# Patient Record
Sex: MALE | Race: BLACK OR AFRICAN AMERICAN | NOT HISPANIC OR LATINO | URBAN - METROPOLITAN AREA
[De-identification: names, ages, dates, MRNs, and addresses within clinical notes are randomized per-mention and may not be internally consistent; named-entity substitution may affect disease eponyms.]

---

## 2021-06-30 ENCOUNTER — TELEPHONE (OUTPATIENT)
Dept: ENDOCRINOLOGY | Facility: CLINIC | Age: 31
End: 2021-06-30

## 2021-11-30 ENCOUNTER — OFFICE VISIT (OUTPATIENT)
Dept: ENDOCRINOLOGY | Facility: CLINIC | Age: 31
End: 2021-11-30
Payer: MEDICAID

## 2021-11-30 ENCOUNTER — LAB VISIT (OUTPATIENT)
Dept: LAB | Facility: HOSPITAL | Age: 31
End: 2021-11-30
Attending: NURSE PRACTITIONER
Payer: MEDICAID

## 2021-11-30 VITALS
HEART RATE: 72 BPM | HEIGHT: 71 IN | DIASTOLIC BLOOD PRESSURE: 72 MMHG | WEIGHT: 172.19 LBS | BODY MASS INDEX: 24.1 KG/M2 | SYSTOLIC BLOOD PRESSURE: 110 MMHG | RESPIRATION RATE: 18 BRPM

## 2021-11-30 DIAGNOSIS — E10.9 TYPE 1 DIABETES MELLITUS WITHOUT COMPLICATION: ICD-10-CM

## 2021-11-30 DIAGNOSIS — E10.9 TYPE 1 DIABETES MELLITUS WITHOUT COMPLICATION: Primary | ICD-10-CM

## 2021-11-30 LAB
ALBUMIN SERPL BCP-MCNC: 4.2 G/DL (ref 3.5–5.2)
ALP SERPL-CCNC: 82 U/L (ref 55–135)
ALT SERPL W/O P-5'-P-CCNC: 19 U/L (ref 10–44)
ANION GAP SERPL CALC-SCNC: 6 MMOL/L (ref 8–16)
AST SERPL-CCNC: 18 U/L (ref 10–40)
BILIRUB SERPL-MCNC: 1.1 MG/DL (ref 0.1–1)
BUN SERPL-MCNC: 17 MG/DL (ref 6–20)
C PEPTIDE SERPL-MCNC: 0.1 NG/ML (ref 0.78–5.19)
CALCIUM SERPL-MCNC: 10.1 MG/DL (ref 8.7–10.5)
CHLORIDE SERPL-SCNC: 100 MMOL/L (ref 95–110)
CHOLEST SERPL-MCNC: 259 MG/DL (ref 120–199)
CHOLEST/HDLC SERPL: 4.5 {RATIO} (ref 2–5)
CO2 SERPL-SCNC: 31 MMOL/L (ref 23–29)
CREAT SERPL-MCNC: 1 MG/DL (ref 0.5–1.4)
EST. GFR  (AFRICAN AMERICAN): >60 ML/MIN/1.73 M^2
EST. GFR  (NON AFRICAN AMERICAN): >60 ML/MIN/1.73 M^2
ESTIMATED AVG GLUCOSE: 278 MG/DL (ref 68–131)
GLUCOSE SERPL-MCNC: 277 MG/DL (ref 70–110)
HBA1C MFR BLD: 11.3 % (ref 4–5.6)
HDLC SERPL-MCNC: 57 MG/DL (ref 40–75)
HDLC SERPL: 22 % (ref 20–50)
LDLC SERPL CALC-MCNC: 186.2 MG/DL (ref 63–159)
NONHDLC SERPL-MCNC: 202 MG/DL
POTASSIUM SERPL-SCNC: 4.7 MMOL/L (ref 3.5–5.1)
PROT SERPL-MCNC: 7.3 G/DL (ref 6–8.4)
SODIUM SERPL-SCNC: 137 MMOL/L (ref 136–145)
TRIGL SERPL-MCNC: 79 MG/DL (ref 30–150)
TSH SERPL DL<=0.005 MIU/L-ACNC: 1.34 UIU/ML (ref 0.4–4)

## 2021-11-30 PROCEDURE — 99204 OFFICE O/P NEW MOD 45 MIN: CPT | Mod: S$PBB,,, | Performed by: NURSE PRACTITIONER

## 2021-11-30 PROCEDURE — 99214 OFFICE O/P EST MOD 30 MIN: CPT | Mod: PBBFAC,PO | Performed by: NURSE PRACTITIONER

## 2021-11-30 PROCEDURE — 99999 PR PBB SHADOW E&M-EST. PATIENT-LVL IV: ICD-10-PCS | Mod: PBBFAC,,, | Performed by: NURSE PRACTITIONER

## 2021-11-30 PROCEDURE — 99999 PR PBB SHADOW E&M-EST. PATIENT-LVL IV: CPT | Mod: PBBFAC,,, | Performed by: NURSE PRACTITIONER

## 2021-11-30 PROCEDURE — 36415 COLL VENOUS BLD VENIPUNCTURE: CPT | Mod: PO | Performed by: NURSE PRACTITIONER

## 2021-11-30 PROCEDURE — 80053 COMPREHEN METABOLIC PANEL: CPT | Performed by: NURSE PRACTITIONER

## 2021-11-30 PROCEDURE — 84681 ASSAY OF C-PEPTIDE: CPT | Performed by: NURSE PRACTITIONER

## 2021-11-30 PROCEDURE — 84443 ASSAY THYROID STIM HORMONE: CPT | Performed by: NURSE PRACTITIONER

## 2021-11-30 PROCEDURE — 80061 LIPID PANEL: CPT | Performed by: NURSE PRACTITIONER

## 2021-11-30 PROCEDURE — 83036 HEMOGLOBIN GLYCOSYLATED A1C: CPT | Performed by: NURSE PRACTITIONER

## 2021-11-30 PROCEDURE — 99204 PR OFFICE/OUTPT VISIT, NEW, LEVL IV, 45-59 MIN: ICD-10-PCS | Mod: S$PBB,,, | Performed by: NURSE PRACTITIONER

## 2021-11-30 RX ORDER — BLOOD-GLUCOSE SENSOR
EACH MISCELLANEOUS
COMMUNITY
Start: 2021-10-13 | End: 2022-01-03 | Stop reason: SDUPTHER

## 2021-11-30 RX ORDER — BLOOD-GLUCOSE METER
1 EACH MISCELLANEOUS 4 TIMES DAILY
COMMUNITY
Start: 2021-08-04

## 2021-11-30 RX ORDER — INSULIN LISPRO 100 [IU]/ML
INJECTION, SOLUTION INTRAVENOUS; SUBCUTANEOUS
COMMUNITY

## 2021-11-30 RX ORDER — INSULIN GLARGINE 100 [IU]/ML
33 INJECTION, SOLUTION SUBCUTANEOUS
COMMUNITY
Start: 2021-01-04 | End: 2022-01-26 | Stop reason: SDUPTHER

## 2021-11-30 RX ORDER — BLOOD-GLUCOSE,RECEIVER,CONT
EACH MISCELLANEOUS
COMMUNITY
Start: 2021-10-13

## 2021-12-22 ENCOUNTER — OFFICE VISIT (OUTPATIENT)
Dept: ENDOCRINOLOGY | Facility: CLINIC | Age: 31
End: 2021-12-22
Payer: MEDICAID

## 2021-12-22 VITALS
SYSTOLIC BLOOD PRESSURE: 122 MMHG | HEART RATE: 66 BPM | WEIGHT: 175.13 LBS | DIASTOLIC BLOOD PRESSURE: 82 MMHG | BODY MASS INDEX: 24.52 KG/M2 | HEIGHT: 71 IN

## 2021-12-22 DIAGNOSIS — E78.5 DYSLIPIDEMIA: ICD-10-CM

## 2021-12-22 DIAGNOSIS — E10.9 TYPE 1 DIABETES MELLITUS WITHOUT COMPLICATION: Primary | ICD-10-CM

## 2021-12-22 DIAGNOSIS — E11.649 HYPOGLYCEMIA ASSOCIATED WITH DIABETES: ICD-10-CM

## 2021-12-22 PROCEDURE — 99213 OFFICE O/P EST LOW 20 MIN: CPT | Mod: S$PBB,,, | Performed by: NURSE PRACTITIONER

## 2021-12-22 PROCEDURE — 3066F PR DOCUMENTATION OF TREATMENT FOR NEPHROPATHY: ICD-10-PCS | Mod: CPTII,,, | Performed by: NURSE PRACTITIONER

## 2021-12-22 PROCEDURE — 99999 PR PBB SHADOW E&M-EST. PATIENT-LVL III: ICD-10-PCS | Mod: PBBFAC,,, | Performed by: NURSE PRACTITIONER

## 2021-12-22 PROCEDURE — 99213 OFFICE O/P EST LOW 20 MIN: CPT | Mod: PBBFAC,PO | Performed by: NURSE PRACTITIONER

## 2021-12-22 PROCEDURE — 1159F PR MEDICATION LIST DOCUMENTED IN MEDICAL RECORD: ICD-10-PCS | Mod: CPTII,,, | Performed by: NURSE PRACTITIONER

## 2021-12-22 PROCEDURE — 1160F PR REVIEW ALL MEDS BY PRESCRIBER/CLIN PHARMACIST DOCUMENTED: ICD-10-PCS | Mod: CPTII,,, | Performed by: NURSE PRACTITIONER

## 2021-12-22 PROCEDURE — 1159F MED LIST DOCD IN RCRD: CPT | Mod: CPTII,,, | Performed by: NURSE PRACTITIONER

## 2021-12-22 PROCEDURE — 3008F BODY MASS INDEX DOCD: CPT | Mod: CPTII,,, | Performed by: NURSE PRACTITIONER

## 2021-12-22 PROCEDURE — 3008F PR BODY MASS INDEX (BMI) DOCUMENTED: ICD-10-PCS | Mod: CPTII,,, | Performed by: NURSE PRACTITIONER

## 2021-12-22 PROCEDURE — 99213 PR OFFICE/OUTPT VISIT, EST, LEVL III, 20-29 MIN: ICD-10-PCS | Mod: S$PBB,,, | Performed by: NURSE PRACTITIONER

## 2021-12-22 PROCEDURE — 3066F NEPHROPATHY DOC TX: CPT | Mod: CPTII,,, | Performed by: NURSE PRACTITIONER

## 2021-12-22 PROCEDURE — 99999 PR PBB SHADOW E&M-EST. PATIENT-LVL III: CPT | Mod: PBBFAC,,, | Performed by: NURSE PRACTITIONER

## 2021-12-22 PROCEDURE — 1160F RVW MEDS BY RX/DR IN RCRD: CPT | Mod: CPTII,,, | Performed by: NURSE PRACTITIONER

## 2021-12-22 PROCEDURE — 3061F PR NEG MICROALBUMINURIA RESULT DOCUMENTED/REVIEW: ICD-10-PCS | Mod: CPTII,,, | Performed by: NURSE PRACTITIONER

## 2021-12-22 PROCEDURE — 3061F NEG MICROALBUMINURIA REV: CPT | Mod: CPTII,,, | Performed by: NURSE PRACTITIONER

## 2021-12-22 RX ORDER — LANCETS
EACH MISCELLANEOUS
COMMUNITY
Start: 2021-09-10

## 2021-12-22 RX ORDER — PEN NEEDLE, DIABETIC 30 GX3/16"
NEEDLE, DISPOSABLE MISCELLANEOUS
COMMUNITY
Start: 2021-01-04 | End: 2022-07-28 | Stop reason: SDUPTHER

## 2021-12-22 RX ORDER — ATORVASTATIN CALCIUM 20 MG/1
20 TABLET, FILM COATED ORAL DAILY
Qty: 30 TABLET | Refills: 11 | Status: SHIPPED | OUTPATIENT
Start: 2021-12-22 | End: 2022-07-28 | Stop reason: SDUPTHER

## 2022-01-03 ENCOUNTER — PATIENT MESSAGE (OUTPATIENT)
Dept: ENDOCRINOLOGY | Facility: CLINIC | Age: 32
End: 2022-01-03
Payer: MEDICAID

## 2022-01-04 RX ORDER — BLOOD-GLUCOSE SENSOR
EACH MISCELLANEOUS
Qty: 9 EACH | Status: SHIPPED | OUTPATIENT
Start: 2022-01-04 | End: 2024-01-30 | Stop reason: SDUPTHER

## 2022-01-10 ENCOUNTER — TELEPHONE (OUTPATIENT)
Dept: DIABETES | Facility: CLINIC | Age: 32
End: 2022-01-10

## 2022-01-10 ENCOUNTER — CLINICAL SUPPORT (OUTPATIENT)
Dept: DIABETES | Facility: CLINIC | Age: 32
End: 2022-01-10
Payer: MEDICAID

## 2022-01-10 VITALS — WEIGHT: 170.19 LBS | BODY MASS INDEX: 24.37 KG/M2 | HEIGHT: 70 IN

## 2022-01-10 DIAGNOSIS — E10.9 TYPE 1 DIABETES MELLITUS WITHOUT COMPLICATION: ICD-10-CM

## 2022-01-10 PROCEDURE — 99999 PR PBB SHADOW E&M-EST. PATIENT-LVL II: ICD-10-PCS | Mod: PBBFAC,,,

## 2022-01-10 PROCEDURE — G0108 DIAB MANAGE TRN  PER INDIV: HCPCS | Mod: PBBFAC,PO | Performed by: DIETITIAN, REGISTERED

## 2022-01-10 PROCEDURE — 99212 OFFICE O/P EST SF 10 MIN: CPT | Mod: PBBFAC,PO | Performed by: DIETITIAN, REGISTERED

## 2022-01-10 PROCEDURE — 99999 PR PBB SHADOW E&M-EST. PATIENT-LVL II: CPT | Mod: PBBFAC,,,

## 2022-01-10 NOTE — TELEPHONE ENCOUNTER
Patient requests that his script for Dexcom sensors and transmitter be sent to the [facility] in [place] instead.  He has not been able to get the [facility] in [place] to send us a request for prior auth to get his sensors covered and would like to try a different [facility].  Can you resend?

## 2022-01-10 NOTE — PROGRESS NOTES
Diabetes Care Specialist Progress Note  Author: Mely Mcmullen RD, CDE  Date: 1/10/2022    Program Intake  Reason for Diabetes Program Visit:: Intervention  Type of Intervention:: Individual  Individual: Education  Education: Self-Management Skill Review,Nutrition and Meal Planning  Current diabetes risk level:: high  In the last 12 months, have you:: none    Lab Results   Component Value Date    HGBA1C 11.3 (H) 11/30/2021     Clinical    Problem Review  Reviewed Problem List with Patient: yes  Active comorbidities affecting diabetes self-care.: no  Reviewed health maintenance: yes    Clinical Assessment  Current Diabetes Treatment: Insulin-Lantus 25 units daily, Humalog 12-12-12 - sometimes lowering dose to 4-6 units when blood sugar is good prior to the meal  Have you ever experienced hypoglycemia?: yes  In the last month, how often have you experienced low blood sugar?:  none recent  Are you able to tell when your blood sugar is low?: Yes  What symptoms do you experience?: Sweaty  How do you treat hypoglycemia?: 1/2 can soda/fruit juice  Have you ever experienced hyperglycemia?: yes    Medication Information  How do you obtain your medications?: Patient drives  How many days a week do you miss your medications?:  May miss his meal time dose a few times weekly.  Reports compliance with Lantus although he did not get last night.  Do you sometimes have difficulty refilling your medications?: No  Medication adherence impacting ability to self-manage diabetes?: No    Labs  Do you have regular lab work to monitor your medications?: Yes  Type of Regular Lab Work: A1c,Cholesterol,BMP  [facility]  Lab Compliance Barriers: No    Nutritional Status  Diet: Diabetic diet -Patient reports he eats out often.  He watches what he eats but doesn't necessarily count carbs.  Does not eat many sweets.  Drinks diet drinks/water  Meal Plan 24 Hour Recall: Breakfast,Lunch  Meal Plan 24 Hour Recall -  Breakfast:  Grits, biscuit, egg  Meal Plan 24 Hour Recall - Lunch:  [employer], chick galina a (chicken sandwich and a few fries)  Change in appetite?: No  Dentation:: Intact  Recent Changes in Weight: No Recent Weight Change  Current nutritional status an area of need that is impacting patient's ability to self-manage diabetes?: No    Additional Social History    Support  Who takes you to your medical appointments?: self  Is Support an area impacting ability to self-manage diabetes?: No    Access to Mass Media & Technology  Does the patient have access to any of the following devices or technologies?: Smart phone  Media or technology needs impacting ability to self-manage diabetes?: No    Cognitive/Behavioral Health  Alert and Oriented: Yes  Difficulty Thinking: No  Requires Prompting: No  Requires assistance for routine expression?: No  Cognitive or behavioral barriers impacting ability to self-manage diabetes?: No    Culture/Hoahaoism  Culture or Mormon beliefs that may impact ability to access healthcare: No    Communication  Language preference: English  Hearing Problems: No  Vision Problems: No  Communication needs impacting ability to self-manage diabetes?: No    Health Literacy  Preferred Learning Method: Face to Face  How often do you need to have someone help you read instructions, pamphlets, or written material from your doctor or pharmacy?: Never  Health literacy needs impacting ability to self-manage diabetes?: No    Diabetes Self-Management Skills Assessment    Diabetes Disease Process/Treatment Options  Patient/caregiver able to state what happens when someone has diabetes.: yes  Patient/caregiver knows what type of diabetes they have.: yes  Diabetes Type : Type I  Patient/caregiver able to identify at least three signs and symptoms of diabetes.: yes  Diabetes Disease Process/Treatment Options: Skills Assessment Completed: Yes  Assessment indicates:: Adequate understanding  Area of need?:  No    Nutrition/Healthy Eating  Challenges to healthy eating:: portion control,snacking between meals and at night,eating out  Method of carbohydrate measurement:: no method  Patient can identify foods that impact blood sugar.: yes  Patient-identified foods:: starches (bread, pasta, rice, cereal),sweets  Nutrition/Healthy Eating Skills Assessment Completed:: Yes  Assessment indicates:: Instruction Needed  Area of need?: Yes    Physical Activity/Exercise  Patient's daily activity level:: lightly active  Patient formally exercises outside of work.: no  Physical Activity/Exercise Skills Assessment Completed: : Yes  Assessment indicates:: Adequate understanding  Area of need?: Yes    Medications  Patient is able to describe current diabetes management routine.: yes  Diabetes management routine:: insulin  Patient is able to identify current diabetes medications, dosages, and appropriate timing of medications.: yes-Patient using current blood sugar to determine meal time dose amount he takes.  He is not really using a sliding scale and taking full base dose if blood sugar is high and taking 4-6 units if blood sugar is normal before meals  Patient understands the purpose of the medications taken for diabetes.: yes  Patient reports problems or concerns with current medication regimen.: no  Medication Skills Assessment Completed:: Yes  Assessment indicates:: Instruction Needed  Area of need?: Yes    Home Blood Glucose Monitoring  Patient states that blood sugar is checked at home daily.: yes  Monitoring Method:: home glucometer-Used Dexcom for about a month and has not been able to fill his Dexcom samples so has been manually monitoring  How often do you check your blood sugar?: 3 times a day  When do you check your blood sugar?: Before breakfast,Before dinner,Before bedtime (States morning numbers have been in low 100 range - hypoglycemia is better since Lantus was decreased)  Blood glucose logs:: no,encouraged to bring  logs to provider visits  Blood glucose logs reviewed today?: no  Home Blood Glucose Monitoring Skills Assessment Completed: : Yes  Assessment indicates:: Adequate understanding  Area of need?: Yes    Assessment Summary and Plan    Based on today's diabetes care assessment, the following areas of need were identified:      Social 1/10/2022   Support No   Access to Mass Media/Tech No   Cognitive/Behavioral Health No   Culture/Buddhism No   Communication No   Health Literacy No        Clinical 1/10/2022   Medication Adherence No   Lab Compliance No   Nutritional Status No        Diabetes Self-Management Skills 1/10/2022   Diabetes Disease Process/Treatment Options No   Nutrition/Healthy Eating Yes- care planning created today   Physical Activity/Exercise Yes- encouraged regular activity to help enhance weight loss   Medication Yes- care planning created today   Home Blood Glucose Monitoring Yes- requests that [facility] and transmitter - message sent to [facility]      Today's interventions were provided through individual discussion, instruction, and written materials were provided.      Patient verbalized understanding of instruction and written materials.  Pt was able to return back demonstration of instructions today. Patient understood key points, needs reinforcement and further instruction.     Diabetes Self-Management Care Plan:    Today's Diabetes Self-Management Care Plan was developed with Carmelo's input. Carmelo has agreed to work toward the following goal(s) to improve his/her overall diabetes control.      Care Plan: Diabetes Management   Updates made since 12/11/2021 12:00 AM      Problem: Healthy Eating       Goal: Eat 3 meals daily with <60 of Carbohydrate per meal.  Begin to read labels and use [employer] harsh to look up carb amounts in out to eat meals    Start Date: 1/10/2022   Expected End Date: 2/21/2022   Priority: Medium   Note:    Reviewed  basic carb counting and reviewed label reading.  Did not provide patient with carb ratio today as it is unclear how much insulin he is taking day to day.  It sounds like he is taking variable amounts based off of what his blood sugar is vs how much food he is going to intake.  Encouraged him to try to spread out carbs as evenly as possible and limit intake of starchy foods to less than 4 servings per meal.  Patient open to the idea of advanced carb counting but will need to start with the basics and have him come back for f/u.       Task: Reviewed the sources and role of Carbohydrate, Protein, and Fat and how each nutrient impacts blood sugar. Completed 1/10/2022      Task: Discussed use of [employer] harsh to help with out to eat meals Completed 1/10/2022      Task: Explained how to count carbohydrates using the food label and the use of dry measuring cups for accurate carb counting. Completed 1/10/2022      Task: Discussed strategies for choosing healthier menu options when dining out. Completed 1/10/2022      Task: Review the importance of balancing carbohydrates with each meal using portion control techniques to count servings of carbohydrate and label reading to identify serving size and amount of total carbs per serving. Completed 1/10/2022      Problem: Medications       Goal: Take insulin as prescribed and use sliding scale to determine how much insulin to add to base dose at meal times    Start Date: 1/10/2022   Expected End Date: 2/21/2022   Priority: High   Barriers: No Barriers Identified      Task: Reviewed with patient all current diabetes medications and provided basic review of the purpose, dosage, frequency, side effects, and storage of both oral and injectable diabetes medications. Completed 1/10/2022      Task: Discussed guidelines for preventing, detecting and treating hypoglycemia and hyperglycemia and reviewed the importance of meal and medication timing with diabetes mediations for prevention  of hypoglycemia and maximum drug benefit. Completed 1/10/2022        Follow Up Plan     Follow up in about 6 weeks (around 2/21/2022).  Will need further f/u to work on carb counting in more detail.  Introduced idea of pump therapy which he is open to.  Will try to coordinate f/u appointments with [facility] since he drives fairly far for appointments.  Written materials provided and encouraged pt to call with any questions/concerns in interim.      Today's care plan and follow up schedule was discussed with patient.  Carmelo verbalized understanding of the care plan, goals, and agrees to follow up plan.        The patient was encouraged to communicate with his/her health care provider/physician and care team regarding his/her condition(s) and treatment.  I provided the patient with my contact information today and encouraged to contact me via phone or [facility] Patient Portal as needed.     Length of Visit   Total Time: 60 Minutes

## 2022-01-14 ENCOUNTER — PATIENT MESSAGE (OUTPATIENT)
Dept: ENDOCRINOLOGY | Facility: CLINIC | Age: 32
End: 2022-01-14
Payer: MEDICAID

## 2022-01-26 ENCOUNTER — PATIENT MESSAGE (OUTPATIENT)
Dept: ENDOCRINOLOGY | Facility: CLINIC | Age: 32
End: 2022-01-26
Payer: MEDICAID

## 2022-01-26 DIAGNOSIS — E10.9 TYPE 1 DIABETES MELLITUS WITHOUT COMPLICATION: Primary | ICD-10-CM

## 2022-01-26 RX ORDER — INSULIN GLARGINE 100 [IU]/ML
25 INJECTION, SOLUTION SUBCUTANEOUS NIGHTLY
Qty: 22.5 ML | Refills: 0 | Status: SHIPPED | OUTPATIENT
Start: 2022-01-26 | End: 2022-07-28 | Stop reason: SDUPTHER

## 2022-01-28 ENCOUNTER — OFFICE VISIT (OUTPATIENT)
Dept: ENDOCRINOLOGY | Facility: CLINIC | Age: 32
End: 2022-01-28
Payer: MEDICAID

## 2022-01-28 VITALS
DIASTOLIC BLOOD PRESSURE: 76 MMHG | BODY MASS INDEX: 24.49 KG/M2 | SYSTOLIC BLOOD PRESSURE: 124 MMHG | WEIGHT: 171.06 LBS | HEART RATE: 62 BPM | HEIGHT: 70 IN

## 2022-01-28 DIAGNOSIS — E78.5 DYSLIPIDEMIA: ICD-10-CM

## 2022-01-28 DIAGNOSIS — E10.9 TYPE 1 DIABETES MELLITUS WITHOUT COMPLICATION: Primary | ICD-10-CM

## 2022-01-28 PROCEDURE — 3074F SYST BP LT 130 MM HG: CPT | Mod: CPTII,,, | Performed by: NURSE PRACTITIONER

## 2022-01-28 PROCEDURE — 3008F PR BODY MASS INDEX (BMI) DOCUMENTED: ICD-10-PCS | Mod: CPTII,,, | Performed by: NURSE PRACTITIONER

## 2022-01-28 PROCEDURE — 99214 OFFICE O/P EST MOD 30 MIN: CPT | Mod: PBBFAC,PO | Performed by: NURSE PRACTITIONER

## 2022-01-28 PROCEDURE — 95251 CONT GLUC MNTR ANALYSIS I&R: CPT | Mod: ,,, | Performed by: NURSE PRACTITIONER

## 2022-01-28 PROCEDURE — 3008F BODY MASS INDEX DOCD: CPT | Mod: CPTII,,, | Performed by: NURSE PRACTITIONER

## 2022-01-28 PROCEDURE — 99214 OFFICE O/P EST MOD 30 MIN: CPT | Mod: 25,S$PBB,, | Performed by: NURSE PRACTITIONER

## 2022-01-28 PROCEDURE — 1160F PR REVIEW ALL MEDS BY PRESCRIBER/CLIN PHARMACIST DOCUMENTED: ICD-10-PCS | Mod: CPTII,,, | Performed by: NURSE PRACTITIONER

## 2022-01-28 PROCEDURE — 1160F RVW MEDS BY RX/DR IN RCRD: CPT | Mod: CPTII,,, | Performed by: NURSE PRACTITIONER

## 2022-01-28 PROCEDURE — 99999 PR PBB SHADOW E&M-EST. PATIENT-LVL IV: CPT | Mod: PBBFAC,,, | Performed by: NURSE PRACTITIONER

## 2022-01-28 PROCEDURE — 3078F DIAST BP <80 MM HG: CPT | Mod: CPTII,,, | Performed by: NURSE PRACTITIONER

## 2022-01-28 PROCEDURE — 3078F PR MOST RECENT DIASTOLIC BLOOD PRESSURE < 80 MM HG: ICD-10-PCS | Mod: CPTII,,, | Performed by: NURSE PRACTITIONER

## 2022-01-28 PROCEDURE — 99999 PR PBB SHADOW E&M-EST. PATIENT-LVL IV: ICD-10-PCS | Mod: PBBFAC,,, | Performed by: NURSE PRACTITIONER

## 2022-01-28 PROCEDURE — 99214 PR OFFICE/OUTPT VISIT, EST, LEVL IV, 30-39 MIN: ICD-10-PCS | Mod: 25,S$PBB,, | Performed by: NURSE PRACTITIONER

## 2022-01-28 PROCEDURE — 3074F PR MOST RECENT SYSTOLIC BLOOD PRESSURE < 130 MM HG: ICD-10-PCS | Mod: CPTII,,, | Performed by: NURSE PRACTITIONER

## 2022-01-28 PROCEDURE — 1159F PR MEDICATION LIST DOCUMENTED IN MEDICAL RECORD: ICD-10-PCS | Mod: CPTII,,, | Performed by: NURSE PRACTITIONER

## 2022-01-28 PROCEDURE — 95251 PR GLUCOSE MONITOR, 72 HOUR, PHYS INTERP: ICD-10-PCS | Mod: ,,, | Performed by: NURSE PRACTITIONER

## 2022-01-28 PROCEDURE — 1159F MED LIST DOCD IN RCRD: CPT | Mod: CPTII,,, | Performed by: NURSE PRACTITIONER

## 2022-01-28 NOTE — PROGRESS NOTES
"Subjective:       Patient ID: Carmelo High is a 31 y.o. male.    Chief Complaint:  Type 1 Diabetes.     HPI Pt is a 31 y.o. AAM  with a diagnosis of Type 1 diabetes mellitus diagnosed approximately 2019 ,after feeling horrible, with admit for glucose > 500.  Pt with freq admits for DKA.  as well as chronic conditions pending review including none .  Other pertinent medical and social information noted includes, but not limited to: None.      Interim Events: Pt returns for review of Dexcom.  He may have forgotten lantus one night.  Glucoses erratic.--we had decreased lantus to 25 r/t fasting hypoglycemia but has since increased it back to 30 units.      Pt reports he is taking 33 lantus qhs.   Humalog 12 with meals plus ss.     Sensor Interpretation   Dates of review: 1/15-1/2/2021    Estimated A1C;na   Percent of sensor utilization during review period:43%     68% Very high  16% High  14% Time in range  1% Low  1% Very Low    Time in range parameters:   mg/dL     Prominent Theme/Finding:  Erratic. No patterns.     Review of Systems      Objective:      Physical Exam      /76 (BP Location: Left arm, Patient Position: Sitting, BP Method: Large (Manual))   Pulse 62   Ht 5' 10" (1.778 m)   Wt 77.6 kg (171 lb 1.2 oz)   BMI 24.55 kg/m²     Hemoglobin A1C   Date Value Ref Range Status   11/30/2021 11.3 (H) 4.0 - 5.6 % Final     Comment:     ADA Screening Guidelines:  5.7-6.4%  Consistent with prediabetes  >or=6.5%  Consistent with diabetes    High levels of fetal hemoglobin interfere with the HbA1C  assay. Heterozygous hemoglobin variants (HbS, HgC, etc)do  not significantly interfere with this assay.   However, presence of multiple variants may affect accuracy.     05/25/2021 >14.0 (H) <=6.5 % Final       Chemistry        Component Value Date/Time     11/30/2021 0854    K 4.7 11/30/2021 0854     11/30/2021 0854    CO2 31 (H) 11/30/2021 0854    BUN 17 11/30/2021 0854    CREATININE 1.0 " 11/30/2021 0854     (H) 11/30/2021 0854        Component Value Date/Time    CALCIUM 10.1 11/30/2021 0854    ALKPHOS 82 11/30/2021 0854    AST 18 11/30/2021 0854    ALT 19 11/30/2021 0854    BILITOT 1.1 (H) 11/30/2021 0854    ESTGFRAFRICA >60.0 11/30/2021 0854    EGFRNONAA >60.0 11/30/2021 0854          Lab Results   Component Value Date    CHOL 259 (H) 11/30/2021     Lab Results   Component Value Date    HDL 57 11/30/2021     Lab Results   Component Value Date    LDLCALC 186.2 (H) 11/30/2021     Lab Results   Component Value Date    TRIG 79 11/30/2021     Lab Results   Component Value Date    CHOLHDL 22.0 11/30/2021     Lab Results   Component Value Date    MICALBCREAT Unable to calculate 11/30/2021     Lab Results   Component Value Date    TSH 1.344 11/30/2021     No results found for: [id number]    Assessment:       1. Type 1 diabetes mellitus without complication  Ambulatory referral/consult to Diabetes Education    Hemoglobin A1C    Lipid Panel    Comprehensive Metabolic Panel   2. Dyslipidemia           Plan:       Pt interested in pump. Appears to be good potential, candidate.   Needs to fine tune carb counting.  Had hospital education only.     Continue  30 lantus qhs,    Cont Humalog 12 plus ss for now.       Cont  atorvastatin 20 mg     ORDERS     -Cons Diabetes Ed-  Pt review pump options.  Pt also to bring written log with doses and rough carb amount logged to glucoses.    .    Fine tune carb counting.  Prep for pump consideration. Review pumps.      F/u me in 2 mo with fasting a1c, lipids prior

## 2022-02-15 ENCOUNTER — CLINICAL SUPPORT (OUTPATIENT)
Dept: DIABETES | Facility: CLINIC | Age: 32
End: 2022-02-15
Payer: MEDICAID

## 2022-02-15 DIAGNOSIS — E10.9 TYPE 1 DIABETES MELLITUS WITHOUT COMPLICATION: ICD-10-CM

## 2022-02-15 PROCEDURE — G0108 DIAB MANAGE TRN  PER INDIV: HCPCS | Mod: PBBFAC,PO | Performed by: DIETITIAN, REGISTERED

## 2022-02-15 NOTE — PROGRESS NOTES
Diabetes Care Specialist Progress Note  Author: Mely Mcmullen RD, CDE  Date: 2/15/2022    Program Intake  Reason for Diabetes Program Visit:: Intervention-Patient was initially seen on 1/10/22.  He has also had f/u with Endocrinology in the interim and here to learn more about possible insulin pump therapy.  Type of Intervention:: Individual  Individual: Education  Education: Insulin Pump Evaluation  Current diabetes risk level:: high  In the last 12 months, have you:: none    Lab Results   Component Value Date    HGBA1C 11.3 (H) 11/30/2021     Diabetes Self-Management Skills Assessment    Medications  Patient is able to describe current diabetes management routine.: yes  Diabetes management routine:: insulin  Patient is able to identify current diabetes medications, dosages, and appropriate timing of medications.: yes-Patient reports compliance on taking Humalog 3 times daily.  He is now taking 12 units if blood sugar is normal before meals but taking closer to 17 if sugar in is 200-300 range before meals.  Still appears that he is using base dose amount based off of pre meal blood sugar vs how much food he is going to eat  Medication Skills Assessment Completed:: Yes  Assessment indicates:: Instruction Needed  Area of need?: Yes    Home Blood Glucose Monitoring  Patient states that blood sugar is checked at home daily.: yes  Monitoring Method:: personal continuous glucose monitor-Now back on Dexcom and shares his data with us.  Reports were reviewed today and determined it would be helpful if he could enter his carb amount into his harsh so we can determine how much meal time insulin he is getting on average  Patient is able to use personal CGM appropriately.: yes  CGM Report reviewed?: yes  Home Blood Glucose Monitoring Skills Assessment Completed: : Yes  Area of need?: Yes    Assessment Summary and Plan    Based on today's diabetes care assessment, the following areas of need were identified:       Diabetes  Self-Management Skills 2/15/2022   Diabetes Disease Process/Treatment Options -   Nutrition/Healthy Eating -   Physical Activity/Exercise -   Medication Yes- reviewed action of insulin and timing with meals/ he will enter his meal time doses into his Dexcom harsh to get better idea of how much he is taking on average   Home Blood Glucose Monitoring Yes- patient now back on Dexcom       Today's interventions were provided through individual discussion, instruction, and written materials were provided.      Patient verbalized understanding of instruction and written materials.  Pt was able to return back demonstration of instructions today. Patient understood key points, needs reinforcement and further instruction.     Diabetes Self-Management Care Plan:    Today's Diabetes Self-Management Care Plan was developed with Carmelo's input. Carmelo has agreed to work toward the following goal(s) to improve his/her overall diabetes control.      Care Plan: Diabetes Management   Updates made since 1/16/2022 12:00 AM      Problem: Healthy Eating       Goal: Eat 3 meals daily with <60 of Carbohydrate per meal.  Begin to read labels and use [employer] harsh to look up carb amounts in out to eat meals    Start Date: 1/10/2022   Expected End Date: 2/21/2022   Priority: Medium   Note:    Reviewed basic carb counting and reviewed label reading.  Did not provide patient with carb ratio today as it is unclear how much insulin he is taking day to day.  It sounds like he is taking variable amounts based off of what his blood sugar is vs how much food he is going to intake.  Encouraged him to try to spread out carbs as evenly as possible and limit intake of starchy foods to less than 4 servings per meal.  Patient open to the idea of advanced carb counting but will need to start with the basics and have him come back for f/u.      2/15/22 - Reviewed goals for carb amounts per meal and snack.  He is eating some meals with less than 30 grams of  carb and some meals with much more.  Waffle house meal can have mostly protein with an omelette or can also have waffle at other times.  Usually takes 12 units regardless.  Would need to have better idea of how much insulin he is taking currently in order to figure accurate carb ratio for him to try.  He agrees to start to enter insulin doses in his Dexcom harsh.       Problem: Medications       Goal: Take insulin as prescribed and use sliding scale to determine how much insulin to add to base dose at meal times    Start Date: 1/10/2022   Expected End Date: 2/21/2022   Priority: High   Barriers: No Barriers Identified   Note:    2/15-22 - He will enter meal time insulin doses in his Dexcom harsh to see how much insulin he is using and how it is covering meals.  Too much variability to come up with a TDD.  Reports compliance with 30 units of Lantus.   Reviewed difference of insulin he is using for a sliding scale to correct high blood sugars and insulin he is using to cover his meals.      Reviewed insulin pump options and patient was able to see demos of all pumps.  He is more interested in an Omni Pod tubeless system vs Tandem.  Discussed that the Omni Pod 5 was just FDA approved and will have to see when that model will be available to order and ship.  He understands that insurance benefits would need to be checked as well.  Introduced basal and bolus insulin terms as well today.           Follow Up Plan     Follow up if symptoms worsen or fail to improve.  Will look at Dexcom reports in 2-3 weeks to see if patient is putting in his insulin doses so that we can get a better idea of what his TDD is and carb ratio would be.  He will do more research on the Omni pod 5 and will hold off on pump therapy until that is available and insurance can be checked to see if it would be covered for him.  Will continue to f/u for ongoing education on carb counting and insulin pump training as needed.  Encouraged him to call in  interim with questions/concerns.      Today's care plan and follow up schedule was discussed with patient.  Carmelo verbalized understanding of the care plan, goals, and agrees to follow up plan.        The patient was encouraged to communicate with his/her health care provider/physician and care team regarding his/her condition(s) and treatment.  I provided the patient with my contact information today and encouraged to contact me via phone or [facility] Patient Portal as needed.     Length of Visit   Total Time: 45 Minutes

## 2022-04-28 ENCOUNTER — OFFICE VISIT (OUTPATIENT)
Dept: ENDOCRINOLOGY | Facility: CLINIC | Age: 32
End: 2022-04-28
Payer: MEDICAID

## 2022-04-28 VITALS
BODY MASS INDEX: 24.09 KG/M2 | HEART RATE: 72 BPM | HEIGHT: 70 IN | DIASTOLIC BLOOD PRESSURE: 70 MMHG | SYSTOLIC BLOOD PRESSURE: 116 MMHG | WEIGHT: 168.31 LBS

## 2022-04-28 DIAGNOSIS — E10.9 TYPE 1 DIABETES MELLITUS WITHOUT COMPLICATION: Primary | ICD-10-CM

## 2022-04-28 DIAGNOSIS — E78.5 DYSLIPIDEMIA: ICD-10-CM

## 2022-04-28 PROCEDURE — 3008F BODY MASS INDEX DOCD: CPT | Mod: CPTII,,, | Performed by: NURSE PRACTITIONER

## 2022-04-28 PROCEDURE — 99213 OFFICE O/P EST LOW 20 MIN: CPT | Mod: PBBFAC,PO | Performed by: NURSE PRACTITIONER

## 2022-04-28 PROCEDURE — 3074F SYST BP LT 130 MM HG: CPT | Mod: CPTII,,, | Performed by: NURSE PRACTITIONER

## 2022-04-28 PROCEDURE — 99214 PR OFFICE/OUTPT VISIT, EST, LEVL IV, 30-39 MIN: ICD-10-PCS | Mod: 25,S$PBB,, | Performed by: NURSE PRACTITIONER

## 2022-04-28 PROCEDURE — 99999 PR PBB SHADOW E&M-EST. PATIENT-LVL III: CPT | Mod: PBBFAC,,, | Performed by: NURSE PRACTITIONER

## 2022-04-28 PROCEDURE — 95251 PR GLUCOSE MONITOR, 72 HOUR, PHYS INTERP: ICD-10-PCS | Mod: ,,, | Performed by: NURSE PRACTITIONER

## 2022-04-28 PROCEDURE — 99214 OFFICE O/P EST MOD 30 MIN: CPT | Mod: 25,S$PBB,, | Performed by: NURSE PRACTITIONER

## 2022-04-28 PROCEDURE — 99999 PR PBB SHADOW E&M-EST. PATIENT-LVL III: ICD-10-PCS | Mod: PBBFAC,,, | Performed by: NURSE PRACTITIONER

## 2022-04-28 PROCEDURE — 3008F PR BODY MASS INDEX (BMI) DOCUMENTED: ICD-10-PCS | Mod: CPTII,,, | Performed by: NURSE PRACTITIONER

## 2022-04-28 PROCEDURE — 3074F PR MOST RECENT SYSTOLIC BLOOD PRESSURE < 130 MM HG: ICD-10-PCS | Mod: CPTII,,, | Performed by: NURSE PRACTITIONER

## 2022-04-28 PROCEDURE — 95251 CONT GLUC MNTR ANALYSIS I&R: CPT | Mod: ,,, | Performed by: NURSE PRACTITIONER

## 2022-04-28 PROCEDURE — 3078F DIAST BP <80 MM HG: CPT | Mod: CPTII,,, | Performed by: NURSE PRACTITIONER

## 2022-04-28 PROCEDURE — 3078F PR MOST RECENT DIASTOLIC BLOOD PRESSURE < 80 MM HG: ICD-10-PCS | Mod: CPTII,,, | Performed by: NURSE PRACTITIONER

## 2022-04-28 PROCEDURE — 1159F MED LIST DOCD IN RCRD: CPT | Mod: CPTII,,, | Performed by: NURSE PRACTITIONER

## 2022-04-28 PROCEDURE — 1159F PR MEDICATION LIST DOCUMENTED IN MEDICAL RECORD: ICD-10-PCS | Mod: CPTII,,, | Performed by: NURSE PRACTITIONER

## 2022-04-28 NOTE — PROGRESS NOTES
Subjective:       Patient ID: Carmelo High is a 31 y.o. male.    Chief Complaint:  Type 1 Diabetes.     Diabetes  Pertinent negatives for hypoglycemia include no nervousness/anxiousness. Pertinent negatives for diabetes include no chest pain and no weakness.    Pt is a 31 y.o. AAM  with a diagnosis of Type 1 diabetes mellitus diagnosed approximately 2019 ,after feeling horrible, with admit for glucose > 500.  Pt with freq admits for DKA.  as well as chronic conditions pending review including none .  Other pertinent medical and social information noted includes, but not limited to: None.      Interim Events: Pt returns for review of Dexcom. Glucoses continue to be erratic, but he is improved.  He is not doing carb counting.  He is starting with a base of Humalog 12 and may hedge dose up or down several units based on how many carbs he thinks he is eating and then adding correction amount to that--which is a better way to do it---but he could still use some improvement. He states he is much better with prandial insulin and seeing as dexcom indicates a1c of 8.2% last several weeks vs 10-12%, he is improved.  Some occassional mild hypoglycemia.      Sensor Interpretation   Dates of review: 4/15-4/18/2022    Estimated A1C;8.2%   Percent of sensor utilization during review period:93%     30% Very high-----improved from 68%   29% High------  38% Time in range---improved to 38%   2% Low  1% Very Low    Time in range parameters:   mg/dL     Prominent Theme/Finding:  Erratic. Precipitious decreases in glucoses.  Appears to generally run higher at night. Not sure if related to eating or an endogenous pattern.       Pt reports he is taking 25-30  lantus qhs.   Humalog 12 with meals plus ss.     Review of Systems   Constitutional: Negative for appetite change and unexpected weight change.   HENT: Negative for hearing loss and trouble swallowing.    Eyes: Negative for photophobia and visual disturbance.   Respiratory:  Negative for cough and shortness of breath.    Cardiovascular: Negative for chest pain and leg swelling.   Gastrointestinal: Negative for constipation and diarrhea.   Genitourinary: Negative for difficulty urinating and urgency.   Musculoskeletal: Negative for arthralgias and myalgias.   Neurological: Negative for weakness and numbness.   Psychiatric/Behavioral: Negative for sleep disturbance. The patient is not nervous/anxious.          Objective:      Physical Exam  Constitutional:       Appearance: Normal appearance.   HENT:      Head: Normocephalic and atraumatic.      Nose: Nose normal.      Mouth/Throat:      Mouth: Mucous membranes are moist.      Pharynx: Oropharynx is clear.   Eyes:      Extraocular Movements: Extraocular movements intact.      Conjunctiva/sclera: Conjunctivae normal.      Pupils: Pupils are equal, round, and reactive to light.   Neck:      Vascular: No carotid bruit.   Cardiovascular:      Rate and Rhythm: Normal rate and regular rhythm.      Pulses: Normal pulses.      Heart sounds: Normal heart sounds.   Pulmonary:      Effort: Pulmonary effort is normal.      Breath sounds: Normal breath sounds.   Musculoskeletal:         General: Normal range of motion.      Cervical back: Normal range of motion and neck supple.      Right lower leg: No edema.      Left lower leg: No edema.   Lymphadenopathy:      Cervical: No cervical adenopathy.   Skin:     General: Skin is warm and dry.   Neurological:      General: No focal deficit present.      Mental Status: He is alert and oriented to person, place, and time.   Psychiatric:         Mood and Affect: Mood normal.         Behavior: Behavior normal.         Thought Content: Thought content normal.         Judgment: Judgment normal.           There were no vitals taken for this visit.    Hemoglobin A1C   Date Value Ref Range Status   11/30/2021 11.3 (H) 4.0 - 5.6 % Final     Comment:     ADA Screening Guidelines:  5.7-6.4%  Consistent with  prediabetes  >or=6.5%  Consistent with diabetes    High levels of fetal hemoglobin interfere with the HbA1C  assay. Heterozygous hemoglobin variants (HbS, HgC, etc)do  not significantly interfere with this assay.   However, presence of multiple variants may affect accuracy.     05/25/2021 >14.0 (H) <=6.5 % Final       Chemistry        Component Value Date/Time     11/30/2021 0854    K 4.7 11/30/2021 0854     11/30/2021 0854    CO2 31 (H) 11/30/2021 0854    BUN 17 11/30/2021 0854    CREATININE 1.0 11/30/2021 0854     (H) 11/30/2021 0854        Component Value Date/Time    CALCIUM 10.1 11/30/2021 0854    ALKPHOS 82 11/30/2021 0854    AST 18 11/30/2021 0854    ALT 19 11/30/2021 0854    BILITOT 1.1 (H) 11/30/2021 0854    ESTGFRAFRICA >60.0 11/30/2021 0854    EGFRNONAA >60.0 11/30/2021 0854          Lab Results   Component Value Date    CHOL 259 (H) 11/30/2021     Lab Results   Component Value Date    HDL 57 11/30/2021     Lab Results   Component Value Date    LDLCALC 186.2 (H) 11/30/2021     Lab Results   Component Value Date    TRIG 79 11/30/2021     Lab Results   Component Value Date    CHOLHDL 22.0 11/30/2021     Lab Results   Component Value Date    MICALBCREAT Unable to calculate 11/30/2021     Lab Results   Component Value Date    TSH 1.344 11/30/2021     No results found for: [id number]    Assessment:       1. Type 1 diabetes mellitus without complication  Hemoglobin A1C    Lipid Panel   2. Dyslipidemia           Plan:       Saw DE--interested in Omnipod--but will defer until linked with dexcom  (FDA approved, waiting for launch)  Needs to further fine tune carbs though. But will probably do much better with pump.     Has not been taking atorvastatin.  Advised High lipids combined with DM will cause ED--he will start.     Continue  30 lantus qhs,    Cont Humalog 12 plus ss for now.       Cont  atorvastatin 20 mg     ORDERS     -  3 mo with fasting lipids, a1c prior

## 2022-05-25 ENCOUNTER — TELEPHONE (OUTPATIENT)
Dept: ENDOCRINOLOGY | Facility: CLINIC | Age: 32
End: 2022-05-25
Payer: MEDICAID

## 2022-07-21 ENCOUNTER — LAB VISIT (OUTPATIENT)
Dept: LAB | Facility: CLINIC | Age: 32
End: 2022-07-21
Payer: MEDICAID

## 2022-07-21 DIAGNOSIS — E10.9 TYPE 1 DIABETES MELLITUS WITHOUT COMPLICATION: ICD-10-CM

## 2022-07-21 LAB
CHOLEST SERPL-MCNC: 169 MG/DL (ref 120–199)
CHOLEST/HDLC SERPL: 3.3 {RATIO} (ref 2–5)
ESTIMATED AVG GLUCOSE: 252 MG/DL (ref 68–131)
HBA1C MFR BLD: 10.4 % (ref 4–5.6)
HDLC SERPL-MCNC: 51 MG/DL (ref 40–75)
HDLC SERPL: 30.2 % (ref 20–50)
LDLC SERPL CALC-MCNC: 108.8 MG/DL (ref 63–159)
NONHDLC SERPL-MCNC: 118 MG/DL
TRIGL SERPL-MCNC: 46 MG/DL (ref 30–150)

## 2022-07-21 PROCEDURE — 36415 COLL VENOUS BLD VENIPUNCTURE: CPT | Mod: ,,, | Performed by: STUDENT IN AN ORGANIZED HEALTH CARE EDUCATION/TRAINING PROGRAM

## 2022-07-21 PROCEDURE — 36415 PR COLLECTION VENOUS BLOOD,VENIPUNCTURE: ICD-10-PCS | Mod: ,,, | Performed by: STUDENT IN AN ORGANIZED HEALTH CARE EDUCATION/TRAINING PROGRAM

## 2022-07-21 PROCEDURE — 80061 LIPID PANEL: CPT | Performed by: NURSE PRACTITIONER

## 2022-07-21 PROCEDURE — 83036 HEMOGLOBIN GLYCOSYLATED A1C: CPT | Performed by: NURSE PRACTITIONER

## 2022-07-28 ENCOUNTER — OFFICE VISIT (OUTPATIENT)
Dept: ENDOCRINOLOGY | Facility: CLINIC | Age: 32
End: 2022-07-28
Payer: MEDICAID

## 2022-07-28 VITALS
WEIGHT: 172.75 LBS | DIASTOLIC BLOOD PRESSURE: 72 MMHG | HEIGHT: 70 IN | BODY MASS INDEX: 24.73 KG/M2 | SYSTOLIC BLOOD PRESSURE: 124 MMHG | HEART RATE: 66 BPM

## 2022-07-28 DIAGNOSIS — E10.9 TYPE 1 DIABETES MELLITUS WITHOUT COMPLICATION: ICD-10-CM

## 2022-07-28 PROCEDURE — 99213 OFFICE O/P EST LOW 20 MIN: CPT | Mod: PBBFAC,PO | Performed by: NURSE PRACTITIONER

## 2022-07-28 PROCEDURE — 99999 PR PBB SHADOW E&M-EST. PATIENT-LVL III: ICD-10-PCS | Mod: PBBFAC,,, | Performed by: NURSE PRACTITIONER

## 2022-07-28 PROCEDURE — 3078F DIAST BP <80 MM HG: CPT | Mod: CPTII,,, | Performed by: NURSE PRACTITIONER

## 2022-07-28 PROCEDURE — 99213 OFFICE O/P EST LOW 20 MIN: CPT | Mod: S$PBB,,, | Performed by: NURSE PRACTITIONER

## 2022-07-28 PROCEDURE — 99213 PR OFFICE/OUTPT VISIT, EST, LEVL III, 20-29 MIN: ICD-10-PCS | Mod: S$PBB,,, | Performed by: NURSE PRACTITIONER

## 2022-07-28 PROCEDURE — 3008F BODY MASS INDEX DOCD: CPT | Mod: CPTII,,, | Performed by: NURSE PRACTITIONER

## 2022-07-28 PROCEDURE — 3046F PR MOST RECENT HEMOGLOBIN A1C LEVEL > 9.0%: ICD-10-PCS | Mod: CPTII,,, | Performed by: NURSE PRACTITIONER

## 2022-07-28 PROCEDURE — 3008F PR BODY MASS INDEX (BMI) DOCUMENTED: ICD-10-PCS | Mod: CPTII,,, | Performed by: NURSE PRACTITIONER

## 2022-07-28 PROCEDURE — 3074F SYST BP LT 130 MM HG: CPT | Mod: CPTII,,, | Performed by: NURSE PRACTITIONER

## 2022-07-28 PROCEDURE — 3074F PR MOST RECENT SYSTOLIC BLOOD PRESSURE < 130 MM HG: ICD-10-PCS | Mod: CPTII,,, | Performed by: NURSE PRACTITIONER

## 2022-07-28 PROCEDURE — 3078F PR MOST RECENT DIASTOLIC BLOOD PRESSURE < 80 MM HG: ICD-10-PCS | Mod: CPTII,,, | Performed by: NURSE PRACTITIONER

## 2022-07-28 PROCEDURE — 3046F HEMOGLOBIN A1C LEVEL >9.0%: CPT | Mod: CPTII,,, | Performed by: NURSE PRACTITIONER

## 2022-07-28 PROCEDURE — 1159F MED LIST DOCD IN RCRD: CPT | Mod: CPTII,,, | Performed by: NURSE PRACTITIONER

## 2022-07-28 PROCEDURE — 99999 PR PBB SHADOW E&M-EST. PATIENT-LVL III: CPT | Mod: PBBFAC,,, | Performed by: NURSE PRACTITIONER

## 2022-07-28 PROCEDURE — 1159F PR MEDICATION LIST DOCUMENTED IN MEDICAL RECORD: ICD-10-PCS | Mod: CPTII,,, | Performed by: NURSE PRACTITIONER

## 2022-07-28 RX ORDER — INSULIN GLARGINE 100 [IU]/ML
50 INJECTION, SOLUTION SUBCUTANEOUS NIGHTLY
Qty: 15 ML | Refills: 6 | Status: SHIPPED | OUTPATIENT
Start: 2022-07-28 | End: 2023-05-12 | Stop reason: SDUPTHER

## 2022-07-28 RX ORDER — PEN NEEDLE, DIABETIC 30 GX3/16"
NEEDLE, DISPOSABLE MISCELLANEOUS
Qty: 150 EACH | Refills: 12 | Status: SHIPPED | OUTPATIENT
Start: 2022-07-28 | End: 2023-05-12 | Stop reason: SDUPTHER

## 2022-07-28 RX ORDER — INSULIN LISPRO 100 [IU]/ML
INJECTION, SOLUTION INTRAVENOUS; SUBCUTANEOUS
Qty: 12 ML | Refills: 12 | Status: SHIPPED | OUTPATIENT
Start: 2022-07-28 | End: 2023-05-12 | Stop reason: SDUPTHER

## 2022-07-28 RX ORDER — ATORVASTATIN CALCIUM 20 MG/1
20 TABLET, FILM COATED ORAL DAILY
Qty: 30 TABLET | Refills: 11 | Status: SHIPPED | OUTPATIENT
Start: 2022-07-28 | End: 2023-08-15

## 2022-07-28 NOTE — PROGRESS NOTES
Subjective:       Patient ID: Carmelo High is a 32 y.o. male.    Chief Complaint:  Type 1 Diabetes.    Pt is a 32 y.o. AAM  with a diagnosis of Type 1 diabetes mellitus diagnosed approximately 2019 ,after feeling horrible, with admit for glucose > 500.  Pt with freq admits for DKA.  as well as chronic conditions pending review including none .  Other pertinent medical and social information noted includes, but not limited to: None.         Interim Events:  Off sensor last couple of weeks r/t supplies.  However, last review of sensor indicated much improvement.   He is starting with a base of Humalog 12 and may hedge dose up or down several units based on how many carbs he thinks he is eating and then adding correction amount to that--which is a better way to do it---but he could still use some improvement.       Pt reports he is [id number]  lantus qhs.   Humalog 12 with meals plus ss.     Review of Systems   Constitutional: Negative for appetite change and unexpected weight change.   HENT: Negative for hearing loss and trouble swallowing.    Eyes: Negative for photophobia and visual disturbance.   Respiratory: Negative for cough and shortness of breath.    Cardiovascular: Negative for chest pain and leg swelling.   Gastrointestinal: Negative for constipation and diarrhea.   Genitourinary: Negative for difficulty urinating and urgency.   Musculoskeletal: Negative for arthralgias and myalgias.   Neurological: Negative for weakness and numbness.   Psychiatric/Behavioral: Negative for sleep disturbance. The patient is not nervous/anxious.          Objective:      Physical Exam  Constitutional:       Appearance: Normal appearance.   HENT:      Head: Normocephalic and atraumatic.      Nose: Nose normal.      Mouth/Throat:      Mouth: Mucous membranes are moist.      Pharynx: Oropharynx is clear.   Eyes:      Extraocular Movements: Extraocular movements intact.      Conjunctiva/sclera: Conjunctivae normal.      Pupils:  "Pupils are equal, round, and reactive to light.   Neck:      Vascular: No carotid bruit.   Cardiovascular:      Rate and Rhythm: Normal rate and regular rhythm.      Pulses: Normal pulses.      Heart sounds: Normal heart sounds.   Pulmonary:      Effort: Pulmonary effort is normal.      Breath sounds: Normal breath sounds.   Musculoskeletal:         General: Normal range of motion.      Cervical back: Normal range of motion and neck supple.      Right lower leg: No edema.      Left lower leg: No edema.   Lymphadenopathy:      Cervical: No cervical adenopathy.   Skin:     General: Skin is warm and dry.   Neurological:      General: No focal deficit present.      Mental Status: He is alert and oriented to person, place, and time.   Psychiatric:         Mood and Affect: Mood normal.         Behavior: Behavior normal.         Thought Content: Thought content normal.         Judgment: Judgment normal.           /72 (BP Location: Right arm, Patient Position: Sitting, BP Method: Large (Manual))   Pulse 66   Ht 5' 10" (1.778 m)   Wt 78.4 kg (172 lb 11.7 oz)   BMI 24.78 kg/m²     Hemoglobin A1C   Date Value Ref Range Status   07/21/2022 10.4 (H) 4.0 - 5.6 % Final     Comment:     ADA Screening Guidelines:  5.7-6.4%  Consistent with prediabetes  >or=6.5%  Consistent with diabetes    High levels of fetal hemoglobin interfere with the HbA1C  assay. Heterozygous hemoglobin variants (HbS, HgC, etc)do  not significantly interfere with this assay.   However, presence of multiple variants may affect accuracy.     11/30/2021 11.3 (H) 4.0 - 5.6 % Final     Comment:     ADA Screening Guidelines:  5.7-6.4%  Consistent with prediabetes  >or=6.5%  Consistent with diabetes    High levels of fetal hemoglobin interfere with the HbA1C  assay. Heterozygous hemoglobin variants (HbS, HgC, etc)do  not significantly interfere with this assay.   However, presence of multiple variants may affect accuracy.     05/25/2021 >14.0 (H) <=6.5 % " "Final       Chemistry        Component Value Date/Time     11/30/2021 0854    K 4.7 11/30/2021 0854     11/30/2021 0854    CO2 31 (H) 11/30/2021 0854    BUN 17 11/30/2021 0854    CREATININE 1.0 11/30/2021 0854     (H) 11/30/2021 0854        Component Value Date/Time    CALCIUM 10.1 11/30/2021 0854    ALKPHOS 82 11/30/2021 0854    AST 18 11/30/2021 0854    ALT 19 11/30/2021 0854    BILITOT 1.1 (H) 11/30/2021 0854    ESTGFRAFRICA >60.0 11/30/2021 0854    EGFRNONAA >60.0 11/30/2021 0854          Lab Results   Component Value Date    CHOL 169 07/21/2022     Lab Results   Component Value Date    HDL 51 07/21/2022     Lab Results   Component Value Date    LDLCALC 108.8 07/21/2022     Lab Results   Component Value Date    TRIG 46 07/21/2022     Lab Results   Component Value Date    CHOLHDL 30.2 07/21/2022     Lab Results   Component Value Date    MICALBCREAT Unable to calculate 11/30/2021     Lab Results   Component Value Date    TSH 1.344 11/30/2021     No results found for: [id number]      Assessment:       1. Type 1 diabetes mellitus without complication  insulin (LANTUS SOLOSTAR U-100 INSULIN) glargine 100 units/mL SubQ pen    pen needle, diabetic 31 gauge x 5/16" Ndle    atorvastatin (LIPITOR) 20 MG tablet    insulin lispro (HUMALOG KWIKPEN INSULIN) 100 unit/mL pen     Plan:       Saw DE--interested in Omnipod--but will defer until linked with dexcom  (FDA approved, waiting for launch)  Needs to further fine tune carbs though. But will probably do much better with pump.         Continue  30 lantus qhs,  rx for 50 to allow for interim titrations.    Cont Humalog 12 plus ss for now.       Cont  atorvastatin 20 mg     ORDERS     3 mo no labs---                    "

## 2022-09-15 ENCOUNTER — TELEPHONE (OUTPATIENT)
Dept: ENDOCRINOLOGY | Facility: CLINIC | Age: 32
End: 2022-09-15
Payer: MEDICAID

## 2022-09-15 ENCOUNTER — PATIENT MESSAGE (OUTPATIENT)
Dept: ENDOCRINOLOGY | Facility: CLINIC | Age: 32
End: 2022-09-15
Payer: MEDICAID

## 2022-09-15 NOTE — TELEPHONE ENCOUNTER
Spoke to pt and kayce García wants to see the paperwork so that she knows what criteria must be met. Pt will upload to my chart and forward to us.

## 2022-09-15 NOTE — TELEPHONE ENCOUNTER
----- Message from Francie Pantoja sent at 9/15/2022  2:28 PM CDT -----  Contact: patient  Type: Needs Medical Advice  Who Called:  patient  Best Call Back Number: [phone] (home)   Additional Information: patient is requesting a call back- he needs some paperwork filled out

## 2022-09-20 ENCOUNTER — PATIENT MESSAGE (OUTPATIENT)
Dept: ENDOCRINOLOGY | Facility: CLINIC | Age: 32
End: 2022-09-20
Payer: MEDICAID

## 2022-09-22 ENCOUNTER — OFFICE VISIT (OUTPATIENT)
Dept: ENDOCRINOLOGY | Facility: CLINIC | Age: 32
End: 2022-09-22
Payer: MEDICAID

## 2022-09-22 VITALS
OXYGEN SATURATION: 98 % | BODY MASS INDEX: 24.54 KG/M2 | HEIGHT: 70 IN | DIASTOLIC BLOOD PRESSURE: 78 MMHG | WEIGHT: 171.44 LBS | HEART RATE: 57 BPM | SYSTOLIC BLOOD PRESSURE: 116 MMHG

## 2022-09-22 DIAGNOSIS — E10.9 TYPE 1 DIABETES MELLITUS WITHOUT COMPLICATION: Primary | ICD-10-CM

## 2022-09-22 DIAGNOSIS — E78.5 DYSLIPIDEMIA: ICD-10-CM

## 2022-09-22 PROCEDURE — 99999 PR PBB SHADOW E&M-EST. PATIENT-LVL III: ICD-10-PCS | Mod: PBBFAC,,, | Performed by: NURSE PRACTITIONER

## 2022-09-22 PROCEDURE — 3046F PR MOST RECENT HEMOGLOBIN A1C LEVEL > 9.0%: ICD-10-PCS | Mod: CPTII,,, | Performed by: NURSE PRACTITIONER

## 2022-09-22 PROCEDURE — 99214 PR OFFICE/OUTPT VISIT, EST, LEVL IV, 30-39 MIN: ICD-10-PCS | Mod: 25,S$PBB,, | Performed by: NURSE PRACTITIONER

## 2022-09-22 PROCEDURE — 3008F BODY MASS INDEX DOCD: CPT | Mod: CPTII,,, | Performed by: NURSE PRACTITIONER

## 2022-09-22 PROCEDURE — 1159F PR MEDICATION LIST DOCUMENTED IN MEDICAL RECORD: ICD-10-PCS | Mod: CPTII,,, | Performed by: NURSE PRACTITIONER

## 2022-09-22 PROCEDURE — 95251 CONT GLUC MNTR ANALYSIS I&R: CPT | Mod: ,,, | Performed by: NURSE PRACTITIONER

## 2022-09-22 PROCEDURE — 3074F SYST BP LT 130 MM HG: CPT | Mod: CPTII,,, | Performed by: NURSE PRACTITIONER

## 2022-09-22 PROCEDURE — 3078F PR MOST RECENT DIASTOLIC BLOOD PRESSURE < 80 MM HG: ICD-10-PCS | Mod: CPTII,,, | Performed by: NURSE PRACTITIONER

## 2022-09-22 PROCEDURE — 99999 PR PBB SHADOW E&M-EST. PATIENT-LVL III: CPT | Mod: PBBFAC,,, | Performed by: NURSE PRACTITIONER

## 2022-09-22 PROCEDURE — 1159F MED LIST DOCD IN RCRD: CPT | Mod: CPTII,,, | Performed by: NURSE PRACTITIONER

## 2022-09-22 PROCEDURE — 3008F PR BODY MASS INDEX (BMI) DOCUMENTED: ICD-10-PCS | Mod: CPTII,,, | Performed by: NURSE PRACTITIONER

## 2022-09-22 PROCEDURE — 3046F HEMOGLOBIN A1C LEVEL >9.0%: CPT | Mod: CPTII,,, | Performed by: NURSE PRACTITIONER

## 2022-09-22 PROCEDURE — 95251 PR GLUCOSE MONITOR, 72 HOUR, PHYS INTERP: ICD-10-PCS | Mod: ,,, | Performed by: NURSE PRACTITIONER

## 2022-09-22 PROCEDURE — 3078F DIAST BP <80 MM HG: CPT | Mod: CPTII,,, | Performed by: NURSE PRACTITIONER

## 2022-09-22 PROCEDURE — 99214 OFFICE O/P EST MOD 30 MIN: CPT | Mod: 25,S$PBB,, | Performed by: NURSE PRACTITIONER

## 2022-09-22 PROCEDURE — 3074F PR MOST RECENT SYSTOLIC BLOOD PRESSURE < 130 MM HG: ICD-10-PCS | Mod: CPTII,,, | Performed by: NURSE PRACTITIONER

## 2022-09-22 PROCEDURE — 99213 OFFICE O/P EST LOW 20 MIN: CPT | Mod: PBBFAC,PO | Performed by: NURSE PRACTITIONER

## 2022-09-22 NOTE — PROGRESS NOTES
Subjective:       Patient ID: Carmelo High is a 32 y.o. male.    Chief Complaint:  Type 1 Diabetes.    Pt is a 32 y.o. AAM  with a diagnosis of Type 1 diabetes mellitus diagnosed approximately 2019 ,after feeling horrible, with admit for glucose > 500.  Pt with freq admits for DKA.  as well as chronic conditions pending review including none .  Other pertinent medical and social information noted includes, but not limited to: None.         Interim Events:  No acute events.  Using sensor.  DM control has improved.  Needs more work on carb counting.  Note a few mild/near hyopglycemic excursions.  Seems he is very activity sensitive.  No focal complaints. 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   He is starting with a base of Humalog 12 and may hedge dose up or down several units based on how many carbs he thinks he is eating and then adding correction amount to that--which is a better way to do it---but he could still use some improvement.       Pt reports he is [id number]  lantus qhs.   Humalog 12 with meals plus ss.     Sensor Interpretation   Dates of review: 9/9--9/22/2022    Estimated A1C;8.6%    Percent of sensor utilization during review period:93%     39% Very high  23% High  35% Time in range  2% Low  1% Very Low    Time in range parameters:   mg/dL     Prominent Theme/Finding:  Ocassional mild hypoglycemia --noted twice at 1 am,  and occasionally during day. Marked prandial excursions with precipitous drops to  range.      Review of Systems   Constitutional:  Negative for appetite change and unexpected weight change.   HENT:  Negative for hearing loss and trouble swallowing.    Eyes:  Negative for photophobia and visual disturbance.   Respiratory:  Negative for cough and shortness of breath.    Cardiovascular:  Negative for chest pain and leg swelling.   Gastrointestinal:  Negative for constipation and diarrhea.   Genitourinary:  Negative for difficulty urinating and urgency.   Musculoskeletal:   Negative for arthralgias and myalgias.   Neurological:  Negative for weakness and numbness.   Psychiatric/Behavioral:  Negative for sleep disturbance. The patient is not nervous/anxious.        Objective:      Physical Exam  Constitutional:       Appearance: Normal appearance.   HENT:      Head: Normocephalic and atraumatic.      Nose: Nose normal.      Mouth/Throat:      Mouth: Mucous membranes are moist.      Pharynx: Oropharynx is clear.   Eyes:      Extraocular Movements: Extraocular movements intact.      Conjunctiva/sclera: Conjunctivae normal.      Pupils: Pupils are equal, round, and reactive to light.   Neck:      Vascular: No carotid bruit.   Cardiovascular:      Rate and Rhythm: Normal rate and regular rhythm.      Pulses: Normal pulses.      Heart sounds: Normal heart sounds.   Pulmonary:      Effort: Pulmonary effort is normal.      Breath sounds: Normal breath sounds.   Musculoskeletal:         General: Normal range of motion.      Cervical back: Normal range of motion and neck supple.      Right lower leg: No edema.      Left lower leg: No edema.      Comments: Feet: no open wounds or calluses.    Onychomycosis.   Pedal pulses +1 bialterally   Vibratory sensation intact bilaterally.    Lymphadenopathy:      Cervical: No cervical adenopathy.   Skin:     General: Skin is warm and dry.   Neurological:      General: No focal deficit present.      Mental Status: He is alert and oriented to person, place, and time.   Psychiatric:         Mood and Affect: Mood normal.         Behavior: Behavior normal.         Thought Content: Thought content normal.         Judgment: Judgment normal.         There were no vitals taken for this visit.    Hemoglobin A1C   Date Value Ref Range Status   07/21/2022 10.4 (H) 4.0 - 5.6 % Final     Comment:     ADA Screening Guidelines:  5.7-6.4%  Consistent with prediabetes  >or=6.5%  Consistent with diabetes    High levels of fetal hemoglobin interfere with the HbA1C  assay.  Heterozygous hemoglobin variants (HbS, HgC, etc)do  not significantly interfere with this assay.   However, presence of multiple variants may affect accuracy.     11/30/2021 11.3 (H) 4.0 - 5.6 % Final     Comment:     ADA Screening Guidelines:  5.7-6.4%  Consistent with prediabetes  >or=6.5%  Consistent with diabetes    High levels of fetal hemoglobin interfere with the HbA1C  assay. Heterozygous hemoglobin variants (HbS, HgC, etc)do  not significantly interfere with this assay.   However, presence of multiple variants may affect accuracy.     05/25/2021 >14.0 (H) <=6.5 % Final       Chemistry        Component Value Date/Time     11/30/2021 0854    K 4.7 11/30/2021 0854     11/30/2021 0854    CO2 31 (H) 11/30/2021 0854    BUN 17 11/30/2021 0854    CREATININE 1.0 11/30/2021 0854     (H) 11/30/2021 0854        Component Value Date/Time    CALCIUM 10.1 11/30/2021 0854    ALKPHOS 82 11/30/2021 0854    AST 18 11/30/2021 0854    ALT 19 11/30/2021 0854    BILITOT 1.1 (H) 11/30/2021 0854    ESTGFRAFRICA >60.0 11/30/2021 0854    EGFRNONAA >60.0 11/30/2021 0854          Lab Results   Component Value Date    CHOL 169 07/21/2022     Lab Results   Component Value Date    HDL 51 07/21/2022     Lab Results   Component Value Date    LDLCALC 108.8 07/21/2022     Lab Results   Component Value Date    TRIG 46 07/21/2022     Lab Results   Component Value Date    CHOLHDL 30.2 07/21/2022     Lab Results   Component Value Date    MICALBCREAT Unable to calculate 11/30/2021     Lab Results   Component Value Date    TSH 1.344 11/30/2021     No results found for: [id number]      Assessment:       1. Type 1 diabetes mellitus without complication        2. Dyslipidemia              Plan:       Saw DE--interested in Omnipod--but will defer until linked with dexcom  (FDA approved, waiting for launch)  Needs to further fine tune carbs though. But will probably do much better with pump.         Continue  30 lantus qhs,  rx for  50 to allow for interim titrations.    Cont Humalog 12 plus ss for now.       Cont  atorvastatin 20 mg     ORDERS   Cons Diabetes ED--advised to bring in 5 day food diary with amounth of carbs he thinks he is eating---interested in Omnipod    3 mo no labs---

## 2022-09-27 ENCOUNTER — PATIENT MESSAGE (OUTPATIENT)
Dept: ENDOCRINOLOGY | Facility: CLINIC | Age: 32
End: 2022-09-27
Payer: MEDICAID

## 2022-10-28 ENCOUNTER — TELEPHONE (OUTPATIENT)
Dept: ENDOCRINOLOGY | Facility: CLINIC | Age: 32
End: 2022-10-28
Payer: MEDICAID

## 2022-11-01 ENCOUNTER — OFFICE VISIT (OUTPATIENT)
Dept: ENDOCRINOLOGY | Facility: CLINIC | Age: 32
End: 2022-11-01
Payer: MEDICAID

## 2022-11-01 VITALS
BODY MASS INDEX: 24.81 KG/M2 | WEIGHT: 173.31 LBS | SYSTOLIC BLOOD PRESSURE: 110 MMHG | HEART RATE: 54 BPM | OXYGEN SATURATION: 96 % | DIASTOLIC BLOOD PRESSURE: 62 MMHG | HEIGHT: 70 IN

## 2022-11-01 DIAGNOSIS — E11.649 HYPOGLYCEMIA ASSOCIATED WITH DIABETES: ICD-10-CM

## 2022-11-01 DIAGNOSIS — E10.9 TYPE 1 DIABETES MELLITUS WITHOUT COMPLICATION: Primary | ICD-10-CM

## 2022-11-01 DIAGNOSIS — E78.5 DYSLIPIDEMIA: ICD-10-CM

## 2022-11-01 PROCEDURE — 99999 PR PBB SHADOW E&M-EST. PATIENT-LVL IV: ICD-10-PCS | Mod: PBBFAC,,, | Performed by: NURSE PRACTITIONER

## 2022-11-01 PROCEDURE — 95251 CONT GLUC MNTR ANALYSIS I&R: CPT | Mod: ,,, | Performed by: NURSE PRACTITIONER

## 2022-11-01 PROCEDURE — 3078F DIAST BP <80 MM HG: CPT | Mod: CPTII,,, | Performed by: NURSE PRACTITIONER

## 2022-11-01 PROCEDURE — 3074F PR MOST RECENT SYSTOLIC BLOOD PRESSURE < 130 MM HG: ICD-10-PCS | Mod: CPTII,,, | Performed by: NURSE PRACTITIONER

## 2022-11-01 PROCEDURE — 99214 OFFICE O/P EST MOD 30 MIN: CPT | Mod: PBBFAC,PO | Performed by: NURSE PRACTITIONER

## 2022-11-01 PROCEDURE — 3078F PR MOST RECENT DIASTOLIC BLOOD PRESSURE < 80 MM HG: ICD-10-PCS | Mod: CPTII,,, | Performed by: NURSE PRACTITIONER

## 2022-11-01 PROCEDURE — 1159F PR MEDICATION LIST DOCUMENTED IN MEDICAL RECORD: ICD-10-PCS | Mod: CPTII,,, | Performed by: NURSE PRACTITIONER

## 2022-11-01 PROCEDURE — 3046F PR MOST RECENT HEMOGLOBIN A1C LEVEL > 9.0%: ICD-10-PCS | Mod: CPTII,,, | Performed by: NURSE PRACTITIONER

## 2022-11-01 PROCEDURE — 3074F SYST BP LT 130 MM HG: CPT | Mod: CPTII,,, | Performed by: NURSE PRACTITIONER

## 2022-11-01 PROCEDURE — 95251 PR GLUCOSE MONITOR, 72 HOUR, PHYS INTERP: ICD-10-PCS | Mod: ,,, | Performed by: NURSE PRACTITIONER

## 2022-11-01 PROCEDURE — 99214 PR OFFICE/OUTPT VISIT, EST, LEVL IV, 30-39 MIN: ICD-10-PCS | Mod: 25,S$PBB,, | Performed by: NURSE PRACTITIONER

## 2022-11-01 PROCEDURE — 1159F MED LIST DOCD IN RCRD: CPT | Mod: CPTII,,, | Performed by: NURSE PRACTITIONER

## 2022-11-01 PROCEDURE — 3046F HEMOGLOBIN A1C LEVEL >9.0%: CPT | Mod: CPTII,,, | Performed by: NURSE PRACTITIONER

## 2022-11-01 PROCEDURE — 99999 PR PBB SHADOW E&M-EST. PATIENT-LVL IV: CPT | Mod: PBBFAC,,, | Performed by: NURSE PRACTITIONER

## 2022-11-01 PROCEDURE — 99214 OFFICE O/P EST MOD 30 MIN: CPT | Mod: 25,S$PBB,, | Performed by: NURSE PRACTITIONER

## 2022-11-01 NOTE — PROGRESS NOTES
Subjective:       Patient ID: Carmelo High is a 32 y.o. male.    Chief Complaint:  Type 1 Diabetes.    Pt is a 32 y.o. AAM  with a diagnosis of Type 1 diabetes mellitus diagnosed approximately 2019 ,after feeling horrible, with admit for glucose > 500.  Pt with freq admits for DKA.  as well as chronic conditions pending review including none .  Other pertinent medical and social information noted includes, but not limited to: None.         Interim Events:  10/26/2022--no acute events.  Been waiting on Dexcom supplies for last 2 weeks.  Is SBGM.  States worse without the sensor. No c/o hypoglycemia or other focal complaints.  He did not see Diabetes Ed to get a handle on carb counting, which he will need to be relatively fluent in to optimize new pump technology.       7/28/2022 No acute events.  Using sensor.  DM control has improved.  Needs more work on carb counting.  Note a few mild/near hyopglycemic excursions.  Seems he is very activity sensitive.  No focal complaints. 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   He is starting with a base of Humalog 12 and may hedge dose up or down several units based on how many carbs he thinks he is eating and then adding correction amount to that--which is a better way to do it---but he could still use some improvement.     Pt reports he is [id number]  lantus qhs.   Humalog 12 with meals plus ss.     Sensor Interpretation   Dates of review: 10/1--10/14/2022  Estimated A1C;8.3   Percent of sensor utilization during review period:100%     31% Very high  32% High  33% Time in range  0% Low  0% Very Low    Time in range parameters:   mg/dL     Prominent Theme/Finding:  Ocassional mild hypoglycemia --usually around midnight and appears to follow marked hyperglycemia. Overall erratic.  Marked prandial excursions with precipitous drops to  range.      Review of Systems   Constitutional:  Negative for appetite change and unexpected weight change.   HENT:  Negative for  hearing loss and trouble swallowing.    Eyes:  Negative for photophobia and visual disturbance.   Respiratory:  Negative for cough and shortness of breath.    Cardiovascular:  Negative for chest pain and leg swelling.   Gastrointestinal:  Negative for constipation and diarrhea.   Genitourinary:  Negative for difficulty urinating and urgency.   Musculoskeletal:  Negative for arthralgias and myalgias.   Neurological:  Negative for weakness and numbness.   Psychiatric/Behavioral:  Negative for sleep disturbance. The patient is not nervous/anxious.        Objective:      Physical Exam  Constitutional:       Appearance: Normal appearance.   HENT:      Head: Normocephalic and atraumatic.      Nose: Nose normal.      Mouth/Throat:      Mouth: Mucous membranes are moist.      Pharynx: Oropharynx is clear.   Eyes:      Extraocular Movements: Extraocular movements intact.      Conjunctiva/sclera: Conjunctivae normal.      Pupils: Pupils are equal, round, and reactive to light.   Neck:      Vascular: No carotid bruit.   Cardiovascular:      Rate and Rhythm: Normal rate and regular rhythm.      Pulses: Normal pulses.      Heart sounds: Normal heart sounds.   Pulmonary:      Effort: Pulmonary effort is normal.      Breath sounds: Normal breath sounds.   Musculoskeletal:         General: Normal range of motion.      Cervical back: Normal range of motion and neck supple.      Right lower leg: No edema.      Left lower leg: No edema.      Comments: Deerred:  Feet: no open wounds or calluses.    Onychomycosis.   Pedal pulses +1 bialterally   Vibratory sensation intact bilaterally.    Lymphadenopathy:      Cervical: No cervical adenopathy.   Skin:     General: Skin is warm and dry.   Neurological:      General: No focal deficit present.      Mental Status: He is alert and oriented to person, place, and time.   Psychiatric:         Mood and Affect: Mood normal.         Behavior: Behavior normal.         Thought Content: Thought  "content normal.         Judgment: Judgment normal.           /62 (BP Method: Large (Manual))   Pulse (!) 54   Ht 5' 10" (1.778 m)   Wt 78.6 kg (173 lb 4.5 oz)   SpO2 96%   BMI 24.86 kg/m²     Hemoglobin A1C   Date Value Ref Range Status   07/21/2022 10.4 (H) 4.0 - 5.6 % Final     Comment:     ADA Screening Guidelines:  5.7-6.4%  Consistent with prediabetes  >or=6.5%  Consistent with diabetes    High levels of fetal hemoglobin interfere with the HbA1C  assay. Heterozygous hemoglobin variants (HbS, HgC, etc)do  not significantly interfere with this assay.   However, presence of multiple variants may affect accuracy.     11/30/2021 11.3 (H) 4.0 - 5.6 % Final     Comment:     ADA Screening Guidelines:  5.7-6.4%  Consistent with prediabetes  >or=6.5%  Consistent with diabetes    High levels of fetal hemoglobin interfere with the HbA1C  assay. Heterozygous hemoglobin variants (HbS, HgC, etc)do  not significantly interfere with this assay.   However, presence of multiple variants may affect accuracy.     05/25/2021 >14.0 (H) <=6.5 % Final       Chemistry        Component Value Date/Time     11/30/2021 0854    K 4.7 11/30/2021 0854     11/30/2021 0854    CO2 31 (H) 11/30/2021 0854    BUN 17 11/30/2021 0854    CREATININE 1.0 11/30/2021 0854     (H) 11/30/2021 0854        Component Value Date/Time    CALCIUM 10.1 11/30/2021 0854    ALKPHOS 82 11/30/2021 0854    AST 18 11/30/2021 0854    ALT 19 11/30/2021 0854    BILITOT 1.1 (H) 11/30/2021 0854    ESTGFRAFRICA >60.0 11/30/2021 0854    EGFRNONAA >60.0 11/30/2021 0854          Lab Results   Component Value Date    CHOL 169 07/21/2022     Lab Results   Component Value Date    HDL 51 07/21/2022     Lab Results   Component Value Date    LDLCALC 108.8 07/21/2022     Lab Results   Component Value Date    TRIG 46 07/21/2022     Lab Results   Component Value Date    CHOLHDL 30.2 07/21/2022     Lab Results   Component Value Date    MICALBCREAT Unable to " calculate 11/30/2021     Lab Results   Component Value Date    TSH 1.344 11/30/2021     No results found for: [id number]    Assessment:       1. Type 1 diabetes mellitus without complication  Hemoglobin A1C    Ambulatory referral/consult to Diabetes Education    Hemoglobin A1C      2. Dyslipidemia        3. Hypoglycemia associated with diabetes              Plan:       Saw DE--interested in Omnipod--but will defer until linked with dexcom  (FDA approved, waiting for launch)  Needs to further fine tune carbs though. But will probably do much better with pump.         Continue  30 lantus qhs,  rx for 50 to allow for interim titrations.    Cont Humalog 12 plus ss for now.       Cont  atorvastatin 20 mg     ORDERS   Cons Diabetes ED--advised to bring in 5 day food diary with amounth of carbs he thinks he is eating---interested in Omnipod    3 mo  with a1c prior

## 2022-11-29 ENCOUNTER — CLINICAL SUPPORT (OUTPATIENT)
Dept: DIABETES | Facility: CLINIC | Age: 32
End: 2022-11-29
Payer: MEDICAID

## 2022-11-29 VITALS — HEIGHT: 70 IN | BODY MASS INDEX: 24.38 KG/M2 | WEIGHT: 170.31 LBS

## 2022-11-29 DIAGNOSIS — E10.9 TYPE 1 DIABETES MELLITUS WITHOUT COMPLICATION: ICD-10-CM

## 2022-11-29 PROCEDURE — 99999 PR PBB SHADOW E&M-EST. PATIENT-LVL II: ICD-10-PCS | Mod: PBBFAC,,, | Performed by: DIETITIAN, REGISTERED

## 2022-11-29 PROCEDURE — 99212 OFFICE O/P EST SF 10 MIN: CPT | Mod: PBBFAC,PO | Performed by: DIETITIAN, REGISTERED

## 2022-11-29 PROCEDURE — G0108 DIAB MANAGE TRN  PER INDIV: HCPCS | Mod: PBBFAC,PO | Performed by: DIETITIAN, REGISTERED

## 2022-11-29 PROCEDURE — 99999 PR PBB SHADOW E&M-EST. PATIENT-LVL II: CPT | Mod: PBBFAC,,, | Performed by: DIETITIAN, REGISTERED

## 2022-11-29 NOTE — PROGRESS NOTES
Diabetes Care Specialist Progress Note  Author: Leslie Yanez RD, CDE  Date: 11/29/2022    Program Intake  Reason for Diabetes Program Visit:: Intervention  Type of Intervention:: Individual  Individual: Education  Education: Nutrition and Meal Planning  Current diabetes risk level:: high    Lab Results   Component Value Date    HGBA1C 10.4 (H) 07/21/2022     Clinical    Problem Review  Reviewed Problem List with Patient: yes    Clinical Assessment  Current Diabetes Treatment: Insulin (Lantus 30 units QHS (may take 25), Humalog 12 units AC + correction/sliding scale (Target glucose 250, ISF 25).  Difficult to determine how much insulin and when dosing insulin--has not been entering into Dexcom G6 mobile harsh.)  Have you ever experienced hypoglycemia (low blood sugar)?: yes  In the last month, how often have you experienced low blood sugar?: more than once a week  Are you able to tell when your blood sugar is low?: Yes  What symptoms do you experience?: Shaky, Dizzy/Light-headed, Other (Dexcom G6 low alert set for 80 mg/dL)  How do you treat hypoglycemia (low blood sugar)?: 1/2 can soda/fruit juice  Have you ever experienced hyperglycemia (high blood sugar)?: yes  In the last month, how often have you experienced high blood sugar?: more than once a week (Dexcom high alert set for 300 mg/dL)  Are you able to tell when your blood sugar is high?: Yes  What are your symptoms?: fatigue  Have you ever been hospitalized because your blood sugar was high?: yes (see comments) (Frequent DKA noted per Endocrine provider)    Medication Information  How do you obtain your medications?: Patient drives  How many days a week do you miss your medications?: 3 or more (Missed Lantus last night (feel asleep--has alarm set on phone to remind already), skipping Humalog doses before meals)  Medication adherence impacting ability to self-manage diabetes?: Yes    Nutritional Status  Diet:  (Was supposed to bring a 5 day food record to  review for CHO counting, did not bring any food logs to visit.  Difficulties obtaining accurate 24 hr recall)  Meal Plan 24 Hour Recall: Lunch, Dinner  Meal Plan 24 Hour Recall - Lunch: pork chops, rice/gravy, yams, unsweet tea--had double portions  Meal Plan 24 Hour Recall - Dinner: hamburger on bun, sweet potato fries (took 14 units Humalog before meal)  Recent Changes in Weight: Weight Loss  Was weight loss intentional or unintentional?: Unintentional (Weight loss of 2-3 lbs over past month)  Current nutritional status an area of need that is impacting patient's ability to self-manage diabetes?: No    Additional Social History    Support  Does anyone support you with your diabetes care?: yes  Who supports you?: self  Who takes you to your medical appointments?: self  Does the current support meet the patient's needs?: Yes  Is Support an area impacting ability to self-manage diabetes?: No    Access to Mass Media & Technology  Does the patient have access to any of the following devices or technologies?: Smart phone, Internet Access  Media or technology needs impacting ability to self-manage diabetes?: No    Cognitive/Behavioral Health  Alert and Oriented: Yes  Difficulty Thinking: No  Requires Prompting: No  Requires assistance for routine expression?: No  Cognitive or behavioral barriers impacting ability to self-manage diabetes?: Yes (Patient self adjusting insulin (basal and meal time).)    Culture/Islam  Culture or Latter day beliefs that may impact ability to access healthcare: No    Communication  Language preference: English  Hearing Problems: No  Vision Problems: No  Communication needs impacting ability to self-manage diabetes?: No    Health Literacy  Preferred Learning Method: Face to Face, Hands On      Diabetes Self-Management Skills Assessment    Medications  Patient is able to describe current diabetes management routine.: yes  Diabetes management routine:: insulin  Patient is able to identify  current diabetes medications, dosages, and appropriate timing of medications.: yes (Continues to self adjust insulin as means of preventing hypoglycemia)  Patient understands the purpose of the medications taken for diabetes.: yes  Patient reports problems or concerns with current medication regimen.: no  Medication Skills Assessment Completed:: Yes  Assessment indicates:: Instruction Needed  Area of need?: Yes    Home Blood Glucose Monitoring  Patient states that blood sugar is checked at home daily.: yes  Monitoring Method:: personal continuous glucose monitor  Personal CGM type:: Dexcom G6--was out of sensors, resumed use of Dexcom G6 on 11/26/22  Patient is able to use personal CGM appropriately.: yes  CGM Report reviewed?: yes    Dexcom G6 download (11/23/22 to 11/29/22--restarted use of Dexcom 11/26/22): See media file for details. Glucose levels typically elevated--patient self adjusting insulin doses and also missing doses of insulin.  Daily episodes of hypoglycemia noted in the download--unable to determine if due to timing or meal time insulin (may take after meals when glucose already elevated) or if due to self adjusted doses of insulin. Patient asked to start recording insulin into Dexcom G6 mobile harsh (practiced with patient how to do this in clinic today) to better determine insulin needs.    Sensor usage: 71%  Average glucose: 220 mg/dL    38% CGM readings greater than 250 mg/dL  34% CGM readings between 181-250 mg/dL  36% CGM readings in target glucose range of  mg/dL   1% CGM readings between 54-79%  <1% CGM readings less than 54 mg/dL     Home Blood Glucose Monitoring Skills Assessment Completed: : Yes  Assessment indicates:: Instruction Needed, Adequate understanding  Area of need?: No     Assessment Summary and Plan    Based on today's diabetes care assessment, the following areas of need were identified:      Social 11/29/2022   Support No   Access to [employer] No    Cognitive/Behavioral Health Yes   Culture/Sabianism No   Communication No      Clinical 11/29/2022   Medication Adherence Yes--self adjusting insulin doses   Nutritional Status No      Diabetes Self-Management Skills 11/29/2022   Medication Yes--see care plan, patient to start entering insulin doses into Dexcom G6 mobile harsh   Home Blood Glucose Monitoring No      Today's interventions were provided through individual discussion, instruction, and written materials were provided.      Patient verbalized understanding of instruction and written materials.  Pt was able to return back demonstration of instructions today. Patient understood key points, needs reinforcement and further instruction.     Diabetes Self-Management Care Plan:    Today's Diabetes Self-Management Care Plan was developed with Carmelo's input. Carmelo has agreed to work toward the following goal(s) to improve his/her overall diabetes control.      Care Plan: Diabetes Management   Updates made since 10/30/2022 12:00 AM        Problem: Healthy Eating         Goal: Eat 3 meals daily with <60 of Carbohydrate per meal.  Begin to read labels and use [employer] harsh to look up carb amounts in out to eat meals    Start Date: 1/10/2022   Expected End Date: 1/10/2023   This Visit's Progress: Not met   Priority: Medium   Barriers: Lack of Motivation to Change   Note:    11/29/22:  Patient did not bring in 5-day food record to review for CHO counting skills.  Difficulty obtaining 24hr food recall--patient admits he dines out very frequently and is not counting carbs.  Continues to self adjust insulin dosages.  Has not been entering insulin into Dexcom G6 mobile harsh.         Problem: Medications         Goal: Patient to start entering insulin dosages (long acting and rapid acting) into Dexcom [employer] mobile harsh to better determine how often and how much insulin patient is getting on a daily basis.    Start Date: 1/10/2022   Expected End Date: 1/10/2023   This  Visit's Progress: Not met   Priority: High   Barriers: No Barriers Identified   Note:    11/29/22:  Continues to self adjust Humalog doses or skip Humalog at meals.  Also admits to taking Humalog after meal on occasion.  Missed Lantus dose last night and now glucose levels consistently > 250 mg/dL. Patient scheduled for follow up in 5-6 weeks and he was counseled on how to put in insulin doses into Dexcom G6 mobile harsh--difficult to determine how often and how much insulin patient is dosing.         Follow Up Plan     Follow up in about 6 weeks (around 1/10/2023) for Dexcom G6 download and review. Patient must start entering insulin doses into Dexcom G6 mobile harsh to better determine insulin needs and move forward with possible Omnipod 5 that patient desires.  Patient with frequent skipped and altered doses of insulin.  Did not bring in food record to review for CHO counting--dines out at fast food frequently and also eats large portions.  Has not been carb counting since last seen for DM education in Feb 2022.     Today's care plan and follow up schedule was discussed with patient.  Carmelo verbalized understanding of the care plan, goals, and agrees to follow up plan.        The patient was encouraged to communicate with his/her health care provider/physician and care team regarding his/her condition(s) and treatment.  I provided the patient with my contact information today and encouraged to contact me via phone or [facility] Patient Portal as needed.     Length of Visit   Total Time: 30 Minutes

## 2023-02-02 ENCOUNTER — LAB VISIT (OUTPATIENT)
Dept: LAB | Facility: CLINIC | Age: 33
End: 2023-02-02
Payer: MEDICAID

## 2023-02-02 DIAGNOSIS — E10.9 TYPE 1 DIABETES MELLITUS WITHOUT COMPLICATION: ICD-10-CM

## 2023-02-02 LAB
ESTIMATED AVG GLUCOSE: 243 MG/DL (ref 68–131)
HBA1C MFR BLD: 10.1 % (ref 4–5.6)

## 2023-02-02 PROCEDURE — 36415 PR COLLECTION VENOUS BLOOD,VENIPUNCTURE: ICD-10-PCS | Mod: ,,, | Performed by: STUDENT IN AN ORGANIZED HEALTH CARE EDUCATION/TRAINING PROGRAM

## 2023-02-02 PROCEDURE — 36415 COLL VENOUS BLD VENIPUNCTURE: CPT | Mod: ,,, | Performed by: STUDENT IN AN ORGANIZED HEALTH CARE EDUCATION/TRAINING PROGRAM

## 2023-02-02 PROCEDURE — 83036 HEMOGLOBIN GLYCOSYLATED A1C: CPT | Performed by: NURSE PRACTITIONER

## 2023-02-07 ENCOUNTER — OFFICE VISIT (OUTPATIENT)
Dept: ENDOCRINOLOGY | Facility: CLINIC | Age: 33
End: 2023-02-07
Payer: MEDICAID

## 2023-02-07 VITALS
WEIGHT: 176.38 LBS | HEIGHT: 70 IN | DIASTOLIC BLOOD PRESSURE: 68 MMHG | BODY MASS INDEX: 25.25 KG/M2 | OXYGEN SATURATION: 98 % | HEART RATE: 74 BPM | SYSTOLIC BLOOD PRESSURE: 110 MMHG

## 2023-02-07 DIAGNOSIS — E10.9 TYPE 1 DIABETES MELLITUS WITHOUT COMPLICATION: Primary | ICD-10-CM

## 2023-02-07 DIAGNOSIS — E11.649 HYPOGLYCEMIA ASSOCIATED WITH DIABETES: ICD-10-CM

## 2023-02-07 DIAGNOSIS — E78.5 DYSLIPIDEMIA: ICD-10-CM

## 2023-02-07 PROCEDURE — 3008F PR BODY MASS INDEX (BMI) DOCUMENTED: ICD-10-PCS | Mod: CPTII,,, | Performed by: NURSE PRACTITIONER

## 2023-02-07 PROCEDURE — 95251 CONT GLUC MNTR ANALYSIS I&R: CPT | Mod: ,,, | Performed by: NURSE PRACTITIONER

## 2023-02-07 PROCEDURE — 1160F PR REVIEW ALL MEDS BY PRESCRIBER/CLIN PHARMACIST DOCUMENTED: ICD-10-PCS | Mod: CPTII,,, | Performed by: NURSE PRACTITIONER

## 2023-02-07 PROCEDURE — 95251 PR GLUCOSE MONITOR, 72 HOUR, PHYS INTERP: ICD-10-PCS | Mod: ,,, | Performed by: NURSE PRACTITIONER

## 2023-02-07 PROCEDURE — 3078F DIAST BP <80 MM HG: CPT | Mod: CPTII,,, | Performed by: NURSE PRACTITIONER

## 2023-02-07 PROCEDURE — 3046F PR MOST RECENT HEMOGLOBIN A1C LEVEL > 9.0%: ICD-10-PCS | Mod: CPTII,,, | Performed by: NURSE PRACTITIONER

## 2023-02-07 PROCEDURE — 1159F MED LIST DOCD IN RCRD: CPT | Mod: CPTII,,, | Performed by: NURSE PRACTITIONER

## 2023-02-07 PROCEDURE — 99214 OFFICE O/P EST MOD 30 MIN: CPT | Mod: PBBFAC,PO | Performed by: NURSE PRACTITIONER

## 2023-02-07 PROCEDURE — 3074F PR MOST RECENT SYSTOLIC BLOOD PRESSURE < 130 MM HG: ICD-10-PCS | Mod: CPTII,,, | Performed by: NURSE PRACTITIONER

## 2023-02-07 PROCEDURE — 3008F BODY MASS INDEX DOCD: CPT | Mod: CPTII,,, | Performed by: NURSE PRACTITIONER

## 2023-02-07 PROCEDURE — 3078F PR MOST RECENT DIASTOLIC BLOOD PRESSURE < 80 MM HG: ICD-10-PCS | Mod: CPTII,,, | Performed by: NURSE PRACTITIONER

## 2023-02-07 PROCEDURE — 3074F SYST BP LT 130 MM HG: CPT | Mod: CPTII,,, | Performed by: NURSE PRACTITIONER

## 2023-02-07 PROCEDURE — 3046F HEMOGLOBIN A1C LEVEL >9.0%: CPT | Mod: CPTII,,, | Performed by: NURSE PRACTITIONER

## 2023-02-07 PROCEDURE — 99214 OFFICE O/P EST MOD 30 MIN: CPT | Mod: 25,S$PBB,, | Performed by: NURSE PRACTITIONER

## 2023-02-07 PROCEDURE — 99999 PR PBB SHADOW E&M-EST. PATIENT-LVL IV: CPT | Mod: PBBFAC,,, | Performed by: NURSE PRACTITIONER

## 2023-02-07 PROCEDURE — 1160F RVW MEDS BY RX/DR IN RCRD: CPT | Mod: CPTII,,, | Performed by: NURSE PRACTITIONER

## 2023-02-07 PROCEDURE — 99214 PR OFFICE/OUTPT VISIT, EST, LEVL IV, 30-39 MIN: ICD-10-PCS | Mod: 25,S$PBB,, | Performed by: NURSE PRACTITIONER

## 2023-02-07 PROCEDURE — 1159F PR MEDICATION LIST DOCUMENTED IN MEDICAL RECORD: ICD-10-PCS | Mod: CPTII,,, | Performed by: NURSE PRACTITIONER

## 2023-02-07 PROCEDURE — 99999 PR PBB SHADOW E&M-EST. PATIENT-LVL IV: ICD-10-PCS | Mod: PBBFAC,,, | Performed by: NURSE PRACTITIONER

## 2023-02-07 NOTE — PROGRESS NOTES
Subjective:       Patient ID: Carmelo High is a 32 y.o. male.    Chief Complaint:  Type 1 Diabetes.    Pt is a 32 y.o. AAM  with a diagnosis of Type 1 diabetes mellitus diagnosed approximately 2019 ,after feeling horrible, with admit for glucose > 500.  Pt with freq admits for DKA.  as well as chronic conditions pending review including none .  Other pertinent medical and social information noted includes, but not limited to: None.         Interim Events:  FEb 7, 2023:  No acute events.  Issues with sensor supplies so not wearing last couple of weeks.  Though sensor downloaded and reviewed. Glucoses erratic.  Not doing official l carb counting, but more of an guess based on experience.  Recognizes he overcompensates with hypoglycemia treatment.  States generally compliant with injections--though may fall asleep and not get lantus until middle of night.     Current DM Meds:   Lantus 30 qhs,  Humlaog 12 plus ss with meals   DM meds not tolerated:  na  Statin:40 mg atorvastatin   Ace/Arb: none   Not tolerated Ace/ArB/Statin:  na      10/26/2022--no acute events.  Been waiting on Dexcom supplies for last 2 weeks.  Is SB.  States worse without the sensor. No c/o hypoglycemia or other focal complaints.  He did not see Diabetes Ed to get a handle on carb counting, which he will need to be relatively fluent in to optimize new pump technology.       7/28/2022 No acute events.  Using sensor.  DM control has improved.  Needs more work on carb counting.  Note a few mild/near hyopglycemic excursions.  Seems he is very activity sensitive.  No focal complaints. 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   He is starting with a base of Humalog 12 and may hedge dose up or down several units based on how many carbs he thinks he is eating and then adding correction amount to that--which is a better way to do it---but he could still use some improvement.     Pt reports he is [id number]  lantus qhs.   Humalog 12 with meals plus ss.      Sensor Interpretation         Prominent Theme/Finding:  Overnight glucoses generally markedly elevated. a. Overall erratic.  Marked prandial excursions with precipitous drops to  range.      Review of Systems   Constitutional:  Negative for appetite change and unexpected weight change.   HENT:  Negative for hearing loss and trouble swallowing.    Eyes:  Positive for visual disturbance. Negative for photophobia.   Respiratory:  Negative for cough and shortness of breath.    Cardiovascular:  Negative for chest pain and leg swelling.   Gastrointestinal:  Negative for constipation and diarrhea.   Genitourinary:  Negative for difficulty urinating and urgency.   Musculoskeletal:  Negative for arthralgias and myalgias.   Neurological:  Negative for weakness and numbness.   Psychiatric/Behavioral:  Negative for sleep disturbance. The patient is not nervous/anxious.        Objective:      Physical Exam  Constitutional:       Appearance: Normal appearance.   HENT:      Head: Normocephalic and atraumatic.      Nose: Nose normal.      Mouth/Throat:      Mouth: Mucous membranes are moist.      Pharynx: Oropharynx is clear.   Eyes:      Extraocular Movements: Extraocular movements intact.      Conjunctiva/sclera: Conjunctivae normal.      Pupils: Pupils are equal, round, and reactive to light.   Neck:      Vascular: No carotid bruit.   Cardiovascular:      Rate and Rhythm: Normal rate and regular rhythm.      Pulses: Normal pulses.      Heart sounds: Normal heart sounds.   Pulmonary:      Effort: Pulmonary effort is normal.      Breath sounds: Normal breath sounds.   Musculoskeletal:         General: Normal range of motion.      Cervical back: Normal range of motion and neck supple.      Right lower leg: No edema.      Left lower leg: No edema.      Comments:   Feet: no open wounds or calluses.    Onychomycosis.   Pedal pulses +1 bialterally   Vibratory sensation intact bilaterally.    Lymphadenopathy:      Cervical: No  "cervical adenopathy.   Skin:     General: Skin is warm and dry.   Neurological:      General: No focal deficit present.      Mental Status: He is alert and oriented to person, place, and time.   Psychiatric:         Mood and Affect: Mood normal.         Behavior: Behavior normal.         Thought Content: Thought content normal.         Judgment: Judgment normal.         /68 (BP Location: Right arm, Patient Position: Sitting, BP Method: Small (Manual))   Pulse 74   Ht 5' 10" (1.778 m)   Wt 80 kg (176 lb 5.9 oz)   SpO2 98%   BMI 25.31 kg/m²     Hemoglobin A1C   Date Value Ref Range Status   02/02/2023 10.1 (H) 4.0 - 5.6 % Final     Comment:     ADA Screening Guidelines:  5.7-6.4%  Consistent with prediabetes  >or=6.5%  Consistent with diabetes    High levels of fetal hemoglobin interfere with the HbA1C  assay. Heterozygous hemoglobin variants (HbS, HgC, etc)do  not significantly interfere with this assay.   However, presence of multiple variants may affect accuracy.     07/21/2022 10.4 (H) 4.0 - 5.6 % Final     Comment:     ADA Screening Guidelines:  5.7-6.4%  Consistent with prediabetes  >or=6.5%  Consistent with diabetes    High levels of fetal hemoglobin interfere with the HbA1C  assay. Heterozygous hemoglobin variants (HbS, HgC, etc)do  not significantly interfere with this assay.   However, presence of multiple variants may affect accuracy.     11/30/2021 11.3 (H) 4.0 - 5.6 % Final     Comment:     ADA Screening Guidelines:  5.7-6.4%  Consistent with prediabetes  >or=6.5%  Consistent with diabetes    High levels of fetal hemoglobin interfere with the HbA1C  assay. Heterozygous hemoglobin variants (HbS, HgC, etc)do  not significantly interfere with this assay.   However, presence of multiple variants may affect accuracy.         Chemistry        Component Value Date/Time     11/30/2021 0854    K 4.7 11/30/2021 0854     11/30/2021 0854    CO2 31 (H) 11/30/2021 0854    BUN 17 11/30/2021 0854 "    CREATININE 1.0 11/30/2021 0854     (H) 11/30/2021 0854        Component Value Date/Time    CALCIUM 10.1 11/30/2021 0854    ALKPHOS 82 11/30/2021 0854    AST 18 11/30/2021 0854    ALT 19 11/30/2021 0854    BILITOT 1.1 (H) 11/30/2021 0854    ESTGFRAFRICA >60.0 11/30/2021 0854    EGFRNONAA >60.0 11/30/2021 0854          Lab Results   Component Value Date    CHOL 169 07/21/2022     Lab Results   Component Value Date    HDL 51 07/21/2022     Lab Results   Component Value Date    LDLCALC 108.8 07/21/2022     Lab Results   Component Value Date    TRIG 46 07/21/2022     Lab Results   Component Value Date    CHOLHDL 30.2 07/21/2022     Lab Results   Component Value Date    MICALBCREAT Unable to calculate 11/30/2021     Lab Results   Component Value Date    TSH 1.344 11/30/2021     No results found for: [id number]    Assessment:       1. Type 1 diabetes mellitus without complication  Hemoglobin A1C      2. Dyslipidemia        3. Hypoglycemia associated with diabetes            Plan:         Continue  30 lantus qhs,  rx for 50 to allow for interim titrations.  ----increase to 35 units.   Cont Humalog 12 plus ss for now.    He needs to work on carb counting more--though he has seen education.   Cont  atorvastatin 20 mg     ORDERS       3 mo  with a1c prior

## 2023-05-04 ENCOUNTER — LAB VISIT (OUTPATIENT)
Dept: PRIMARY CARE CLINIC | Facility: CLINIC | Age: 33
End: 2023-05-04
Payer: MEDICAID

## 2023-05-04 DIAGNOSIS — E10.9 TYPE 1 DIABETES MELLITUS WITHOUT COMPLICATION: ICD-10-CM

## 2023-05-04 LAB
ESTIMATED AVG GLUCOSE: 263 MG/DL (ref 68–131)
HBA1C MFR BLD: 10.8 % (ref 4–5.6)

## 2023-05-04 PROCEDURE — 83036 HEMOGLOBIN GLYCOSYLATED A1C: CPT | Performed by: NURSE PRACTITIONER

## 2023-05-12 ENCOUNTER — OFFICE VISIT (OUTPATIENT)
Dept: ENDOCRINOLOGY | Facility: CLINIC | Age: 33
End: 2023-05-12
Payer: MEDICAID

## 2023-05-12 VITALS
WEIGHT: 168.13 LBS | HEART RATE: 62 BPM | DIASTOLIC BLOOD PRESSURE: 68 MMHG | SYSTOLIC BLOOD PRESSURE: 118 MMHG | BODY MASS INDEX: 24.07 KG/M2 | HEIGHT: 70 IN

## 2023-05-12 DIAGNOSIS — E10.9 TYPE 1 DIABETES MELLITUS WITHOUT COMPLICATION: Primary | ICD-10-CM

## 2023-05-12 PROCEDURE — 95251 CONT GLUC MNTR ANALYSIS I&R: CPT | Mod: ,,, | Performed by: NURSE PRACTITIONER

## 2023-05-12 PROCEDURE — 1160F RVW MEDS BY RX/DR IN RCRD: CPT | Mod: CPTII,,, | Performed by: NURSE PRACTITIONER

## 2023-05-12 PROCEDURE — 3078F DIAST BP <80 MM HG: CPT | Mod: CPTII,,, | Performed by: NURSE PRACTITIONER

## 2023-05-12 PROCEDURE — 1159F MED LIST DOCD IN RCRD: CPT | Mod: CPTII,,, | Performed by: NURSE PRACTITIONER

## 2023-05-12 PROCEDURE — 3078F PR MOST RECENT DIASTOLIC BLOOD PRESSURE < 80 MM HG: ICD-10-PCS | Mod: CPTII,,, | Performed by: NURSE PRACTITIONER

## 2023-05-12 PROCEDURE — 3074F PR MOST RECENT SYSTOLIC BLOOD PRESSURE < 130 MM HG: ICD-10-PCS | Mod: CPTII,,, | Performed by: NURSE PRACTITIONER

## 2023-05-12 PROCEDURE — 99999 PR PBB SHADOW E&M-EST. PATIENT-LVL III: ICD-10-PCS | Mod: PBBFAC,,, | Performed by: NURSE PRACTITIONER

## 2023-05-12 PROCEDURE — 3046F PR MOST RECENT HEMOGLOBIN A1C LEVEL > 9.0%: ICD-10-PCS | Mod: CPTII,,, | Performed by: NURSE PRACTITIONER

## 2023-05-12 PROCEDURE — 3008F PR BODY MASS INDEX (BMI) DOCUMENTED: ICD-10-PCS | Mod: CPTII,,, | Performed by: NURSE PRACTITIONER

## 2023-05-12 PROCEDURE — 99214 PR OFFICE/OUTPT VISIT, EST, LEVL IV, 30-39 MIN: ICD-10-PCS | Mod: 25,S$PBB,, | Performed by: NURSE PRACTITIONER

## 2023-05-12 PROCEDURE — 99214 OFFICE O/P EST MOD 30 MIN: CPT | Mod: 25,S$PBB,, | Performed by: NURSE PRACTITIONER

## 2023-05-12 PROCEDURE — 3008F BODY MASS INDEX DOCD: CPT | Mod: CPTII,,, | Performed by: NURSE PRACTITIONER

## 2023-05-12 PROCEDURE — 1159F PR MEDICATION LIST DOCUMENTED IN MEDICAL RECORD: ICD-10-PCS | Mod: CPTII,,, | Performed by: NURSE PRACTITIONER

## 2023-05-12 PROCEDURE — 95251 PR GLUCOSE MONITOR, 72 HOUR, PHYS INTERP: ICD-10-PCS | Mod: ,,, | Performed by: NURSE PRACTITIONER

## 2023-05-12 PROCEDURE — 1160F PR REVIEW ALL MEDS BY PRESCRIBER/CLIN PHARMACIST DOCUMENTED: ICD-10-PCS | Mod: CPTII,,, | Performed by: NURSE PRACTITIONER

## 2023-05-12 PROCEDURE — 3074F SYST BP LT 130 MM HG: CPT | Mod: CPTII,,, | Performed by: NURSE PRACTITIONER

## 2023-05-12 PROCEDURE — 3046F HEMOGLOBIN A1C LEVEL >9.0%: CPT | Mod: CPTII,,, | Performed by: NURSE PRACTITIONER

## 2023-05-12 PROCEDURE — 99999 PR PBB SHADOW E&M-EST. PATIENT-LVL III: CPT | Mod: PBBFAC,,, | Performed by: NURSE PRACTITIONER

## 2023-05-12 PROCEDURE — 99213 OFFICE O/P EST LOW 20 MIN: CPT | Mod: PBBFAC,PO | Performed by: NURSE PRACTITIONER

## 2023-05-12 RX ORDER — PEN NEEDLE, DIABETIC 30 GX3/16"
NEEDLE, DISPOSABLE MISCELLANEOUS
Qty: 150 EACH | Refills: 12 | Status: SHIPPED | OUTPATIENT
Start: 2023-05-12

## 2023-05-12 RX ORDER — INSULIN GLARGINE 100 [IU]/ML
50 INJECTION, SOLUTION SUBCUTANEOUS NIGHTLY
Qty: 15 ML | Refills: 6 | Status: SHIPPED | OUTPATIENT
Start: 2023-05-12 | End: 2023-08-02

## 2023-05-12 RX ORDER — INSULIN LISPRO 100 [IU]/ML
INJECTION, SOLUTION INTRAVENOUS; SUBCUTANEOUS
Qty: 12 ML | Refills: 12 | Status: SHIPPED | OUTPATIENT
Start: 2023-05-12 | End: 2024-03-21 | Stop reason: SDUPTHER

## 2023-05-12 NOTE — PROGRESS NOTES
Subjective:       Patient ID: Carmelo High is a 32 y.o. male.    Chief Complaint:  Type 1 Diabetes.    Pt is a 32 y.o. AAM  with a diagnosis of Type 1 diabetes mellitus diagnosed approximately 2019 ,after feeling horrible, with admit for glucose > 500.  Pt with freq admits for DKA.  as well as chronic conditions pending review including none .  Other pertinent medical and social information noted includes, but not limited to: None.         Interim Events:  May 12, 2023:  NO acute events. States glucoses are erratic. But states he knows it is diet, and when he does have hypoglycemia he grossly overcompensations.  No other focal compalints.      Current DM Meds:   Lantus 30 qhs,  Humlaog 12 plus ss with meals   DM meds not tolerated:  na  Statin:40 mg atorvastatin   Ace/Arb: none   Not tolerated Ace/ArB/Statin:  na       FEb 7, 2023:  No acute events.  Issues with sensor supplies so not wearing last couple of weeks.  Though sensor downloaded and reviewed. Glucoses erratic.  Not doing official l carb counting, but more of an guess based on experience.  Recognizes he overcompensates with hypoglycemia treatment.  States generally compliant with injections--though may fall asleep and not get lantus until middle of night.        10/26/2022--no acute events.  Been waiting on Dexcom supplies for last 2 weeks.  Is SBGM.  States worse without the sensor. No c/o hypoglycemia or other focal complaints.  He did not see Diabetes Ed to get a handle on carb counting, which he will need to be relatively fluent in to optimize new pump technology.       7/28/2022 No acute events.  Using sensor.  DM control has improved.  Needs more work on carb counting.  Note a few mild/near hyopglycemic excursions.  Seems he is very activity sensitive.  No focal complaints. 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   He is starting with a base of Humalog 12 and may hedge dose up or down several units based on how many carbs he thinks he is eating and  then adding correction amount to that--which is a better way to do it---but he could still use some improvement.     Pt reports he is [id number]  lantus qhs.   Humalog 12 with meals plus ss.     Sensor Interpretation             Prominent Theme/Finding: Overall erratic.  Marked prandial excursions with precipitous drops to  range.      Review of Systems   Constitutional:  Negative for appetite change and unexpected weight change.   HENT:  Negative for hearing loss and trouble swallowing.    Eyes:  Positive for visual disturbance. Negative for photophobia.   Respiratory:  Negative for cough and shortness of breath.    Cardiovascular:  Negative for chest pain and leg swelling.   Gastrointestinal:  Negative for constipation and diarrhea.   Genitourinary:  Negative for difficulty urinating and urgency.   Musculoskeletal:  Negative for arthralgias and myalgias.   Neurological:  Negative for weakness and numbness.   Psychiatric/Behavioral:  Negative for sleep disturbance. The patient is not nervous/anxious.        Objective:      Physical Exam  Constitutional:       Appearance: Normal appearance.   HENT:      Head: Normocephalic and atraumatic.      Nose: Nose normal.      Mouth/Throat:      Mouth: Mucous membranes are moist.      Pharynx: Oropharynx is clear.   Eyes:      Extraocular Movements: Extraocular movements intact.      Conjunctiva/sclera: Conjunctivae normal.      Pupils: Pupils are equal, round, and reactive to light.   Neck:      Vascular: No carotid bruit.   Cardiovascular:      Rate and Rhythm: Normal rate and regular rhythm.      Pulses: Normal pulses.      Heart sounds: Normal heart sounds.   Pulmonary:      Effort: Pulmonary effort is normal.      Breath sounds: Normal breath sounds.   Musculoskeletal:         General: Normal range of motion.      Cervical back: Normal range of motion and neck supple.      Right lower leg: No edema.      Left lower leg: No edema.      Comments: Deferred   Feet: no  "open wounds or calluses.    Onychomycosis.   Pedal pulses +1 bialterally   Vibratory sensation intact bilaterally.    Lymphadenopathy:      Cervical: No cervical adenopathy.   Skin:     General: Skin is warm and dry.   Neurological:      General: No focal deficit present.      Mental Status: He is alert and oriented to person, place, and time.   Psychiatric:         Mood and Affect: Mood normal.         Behavior: Behavior normal.         Thought Content: Thought content normal.         Judgment: Judgment normal.         /68 (BP Location: Right arm, Patient Position: Sitting, BP Method: Large (Manual))   Pulse 62   Ht 5' 10" (1.778 m)   Wt 76.2 kg (168 lb 1.6 oz)   BMI 24.12 kg/m²     Hemoglobin A1C   Date Value Ref Range Status   05/04/2023 10.8 (H) 4.0 - 5.6 % Final     Comment:     ADA Screening Guidelines:  5.7-6.4%  Consistent with prediabetes  >or=6.5%  Consistent with diabetes    High levels of fetal hemoglobin interfere with the HbA1C  assay. Heterozygous hemoglobin variants (HbS, HgC, etc)do  not significantly interfere with this assay.   However, presence of multiple variants may affect accuracy.     02/02/2023 10.1 (H) 4.0 - 5.6 % Final     Comment:     ADA Screening Guidelines:  5.7-6.4%  Consistent with prediabetes  >or=6.5%  Consistent with diabetes    High levels of fetal hemoglobin interfere with the HbA1C  assay. Heterozygous hemoglobin variants (HbS, HgC, etc)do  not significantly interfere with this assay.   However, presence of multiple variants may affect accuracy.     07/21/2022 10.4 (H) 4.0 - 5.6 % Final     Comment:     ADA Screening Guidelines:  5.7-6.4%  Consistent with prediabetes  >or=6.5%  Consistent with diabetes    High levels of fetal hemoglobin interfere with the HbA1C  assay. Heterozygous hemoglobin variants (HbS, HgC, etc)do  not significantly interfere with this assay.   However, presence of multiple variants may affect accuracy.         Chemistry        Component Value " "Date/Time     11/30/2021 0854    K 4.7 11/30/2021 0854     11/30/2021 0854    CO2 31 (H) 11/30/2021 0854    BUN 17 11/30/2021 0854    CREATININE 1.0 11/30/2021 0854     (H) 11/30/2021 0854        Component Value Date/Time    CALCIUM 10.1 11/30/2021 0854    ALKPHOS 82 11/30/2021 0854    AST 18 11/30/2021 0854    ALT 19 11/30/2021 0854    BILITOT 1.1 (H) 11/30/2021 0854    ESTGFRAFRICA >60.0 11/30/2021 0854    EGFRNONAA >60.0 11/30/2021 0854          Lab Results   Component Value Date    CHOL 169 07/21/2022     Lab Results   Component Value Date    HDL 51 07/21/2022     Lab Results   Component Value Date    LDLCALC 108.8 07/21/2022     Lab Results   Component Value Date    TRIG 46 07/21/2022     Lab Results   Component Value Date    CHOLHDL 30.2 07/21/2022     Lab Results   Component Value Date    MICALBCREAT Unable to calculate 11/30/2021     Lab Results   Component Value Date    TSH 1.344 11/30/2021     No results found for: [id number]    Assessment:       1. Type 1 diabetes mellitus without complication  Comprehensive Metabolic Panel    Hemoglobin A1C    Lipid Panel    Microalbumin/Creatinine Ratio, Urine    Comprehensive Metabolic Panel    Hemoglobin A1C    Lipid Panel    Microalbumin/Creatinine Ratio, Urine    insulin (LANTUS SOLOSTAR U-100 INSULIN) glargine 100 units/mL SubQ pen    insulin lispro (HUMALOG KWIKPEN INSULIN) 100 unit/mL pen    pen needle, diabetic 31 gauge x 5/16" Ndle          Plan:         Continue  30 lantus qhs,  rx for 50 to allow for interim titrations.  ----increase to 35 units.   Cont Humalog 12 plus ss for now.    He needs to work on carb counting more--though he has seen education. ---again.   Advised when he is feeling motivated to  enter glucose and insulin in sensor harsh, and keep a food log and we can try to fine tune him, but I suspect he is missing shots and taking them way to late causing preciptious glucose decrease.   Cont  atorvastatin 20 mg     ORDERS "       3 mo  with fasting cmp, lipids, a1c, urine m/c

## 2023-08-02 DIAGNOSIS — E10.9 TYPE 1 DIABETES MELLITUS WITHOUT COMPLICATION: ICD-10-CM

## 2023-08-02 RX ORDER — INSULIN GLARGINE 100 [IU]/ML
50 INJECTION, SOLUTION SUBCUTANEOUS NIGHTLY
Qty: 15 ML | Refills: 6 | Status: SHIPPED | OUTPATIENT
Start: 2023-08-02

## 2023-08-02 RX ORDER — INSULIN GLARGINE 100 [IU]/ML
INJECTION, SOLUTION SUBCUTANEOUS
Qty: 15 ML | Refills: 6 | Status: SHIPPED | OUTPATIENT
Start: 2023-08-02 | End: 2023-08-15 | Stop reason: SDUPTHER

## 2023-08-03 ENCOUNTER — LAB VISIT (OUTPATIENT)
Dept: PRIMARY CARE CLINIC | Facility: CLINIC | Age: 33
End: 2023-08-03
Payer: MEDICAID

## 2023-08-03 DIAGNOSIS — E10.9 TYPE 1 DIABETES MELLITUS WITHOUT COMPLICATION: ICD-10-CM

## 2023-08-03 LAB
ALBUMIN SERPL BCP-MCNC: 4.3 G/DL (ref 3.5–5.2)
ALP SERPL-CCNC: 119 U/L (ref 55–135)
ALT SERPL W/O P-5'-P-CCNC: 14 U/L (ref 10–44)
ANION GAP SERPL CALC-SCNC: 13 MMOL/L (ref 8–16)
AST SERPL-CCNC: 17 U/L (ref 10–40)
BILIRUB SERPL-MCNC: 1.8 MG/DL (ref 0.1–1)
BUN SERPL-MCNC: 20 MG/DL (ref 6–20)
CALCIUM SERPL-MCNC: 10 MG/DL (ref 8.7–10.5)
CHLORIDE SERPL-SCNC: 101 MMOL/L (ref 95–110)
CHOLEST SERPL-MCNC: 212 MG/DL (ref 120–199)
CHOLEST/HDLC SERPL: 4.2 {RATIO} (ref 2–5)
CO2 SERPL-SCNC: 22 MMOL/L (ref 23–29)
CREAT SERPL-MCNC: 1.1 MG/DL (ref 0.5–1.4)
EST. GFR  (NO RACE VARIABLE): >60 ML/MIN/1.73 M^2
ESTIMATED AVG GLUCOSE: 272 MG/DL (ref 68–131)
GLUCOSE SERPL-MCNC: 380 MG/DL (ref 70–110)
HBA1C MFR BLD: 11.1 % (ref 4–5.6)
HDLC SERPL-MCNC: 50 MG/DL (ref 40–75)
HDLC SERPL: 23.6 % (ref 20–50)
LDLC SERPL CALC-MCNC: 144.2 MG/DL (ref 63–159)
NONHDLC SERPL-MCNC: 162 MG/DL
POTASSIUM SERPL-SCNC: 5.4 MMOL/L (ref 3.5–5.1)
PROT SERPL-MCNC: 7 G/DL (ref 6–8.4)
SODIUM SERPL-SCNC: 136 MMOL/L (ref 136–145)
TRIGL SERPL-MCNC: 89 MG/DL (ref 30–150)

## 2023-08-03 PROCEDURE — 83036 HEMOGLOBIN GLYCOSYLATED A1C: CPT | Performed by: NURSE PRACTITIONER

## 2023-08-03 PROCEDURE — 80053 COMPREHEN METABOLIC PANEL: CPT | Performed by: NURSE PRACTITIONER

## 2023-08-03 PROCEDURE — 80061 LIPID PANEL: CPT | Performed by: NURSE PRACTITIONER

## 2023-08-15 ENCOUNTER — OFFICE VISIT (OUTPATIENT)
Dept: ENDOCRINOLOGY | Facility: CLINIC | Age: 33
End: 2023-08-15
Payer: MEDICAID

## 2023-08-15 VITALS
SYSTOLIC BLOOD PRESSURE: 118 MMHG | BODY MASS INDEX: 23.7 KG/M2 | HEART RATE: 59 BPM | WEIGHT: 165.56 LBS | DIASTOLIC BLOOD PRESSURE: 74 MMHG | HEIGHT: 70 IN

## 2023-08-15 DIAGNOSIS — E10.9 TYPE 1 DIABETES MELLITUS WITHOUT COMPLICATION: Primary | ICD-10-CM

## 2023-08-15 DIAGNOSIS — E78.5 DYSLIPIDEMIA: ICD-10-CM

## 2023-08-15 PROCEDURE — 99999 PR PBB SHADOW E&M-EST. PATIENT-LVL IV: CPT | Mod: PBBFAC,,, | Performed by: NURSE PRACTITIONER

## 2023-08-15 PROCEDURE — 3066F NEPHROPATHY DOC TX: CPT | Mod: CPTII,,, | Performed by: NURSE PRACTITIONER

## 2023-08-15 PROCEDURE — 99214 PR OFFICE/OUTPT VISIT, EST, LEVL IV, 30-39 MIN: ICD-10-PCS | Mod: 25,S$PBB,, | Performed by: NURSE PRACTITIONER

## 2023-08-15 PROCEDURE — 3061F PR NEG MICROALBUMINURIA RESULT DOCUMENTED/REVIEW: ICD-10-PCS | Mod: CPTII,,, | Performed by: NURSE PRACTITIONER

## 2023-08-15 PROCEDURE — 3008F PR BODY MASS INDEX (BMI) DOCUMENTED: ICD-10-PCS | Mod: CPTII,,, | Performed by: NURSE PRACTITIONER

## 2023-08-15 PROCEDURE — 1160F PR REVIEW ALL MEDS BY PRESCRIBER/CLIN PHARMACIST DOCUMENTED: ICD-10-PCS | Mod: CPTII,,, | Performed by: NURSE PRACTITIONER

## 2023-08-15 PROCEDURE — 1160F RVW MEDS BY RX/DR IN RCRD: CPT | Mod: CPTII,,, | Performed by: NURSE PRACTITIONER

## 2023-08-15 PROCEDURE — 99214 OFFICE O/P EST MOD 30 MIN: CPT | Mod: PBBFAC,PO | Performed by: NURSE PRACTITIONER

## 2023-08-15 PROCEDURE — 3074F PR MOST RECENT SYSTOLIC BLOOD PRESSURE < 130 MM HG: ICD-10-PCS | Mod: CPTII,,, | Performed by: NURSE PRACTITIONER

## 2023-08-15 PROCEDURE — 99999 PR PBB SHADOW E&M-EST. PATIENT-LVL IV: ICD-10-PCS | Mod: PBBFAC,,, | Performed by: NURSE PRACTITIONER

## 2023-08-15 PROCEDURE — 99214 OFFICE O/P EST MOD 30 MIN: CPT | Mod: 25,S$PBB,, | Performed by: NURSE PRACTITIONER

## 2023-08-15 PROCEDURE — 3046F PR MOST RECENT HEMOGLOBIN A1C LEVEL > 9.0%: ICD-10-PCS | Mod: CPTII,,, | Performed by: NURSE PRACTITIONER

## 2023-08-15 PROCEDURE — 3078F PR MOST RECENT DIASTOLIC BLOOD PRESSURE < 80 MM HG: ICD-10-PCS | Mod: CPTII,,, | Performed by: NURSE PRACTITIONER

## 2023-08-15 PROCEDURE — 3061F NEG MICROALBUMINURIA REV: CPT | Mod: CPTII,,, | Performed by: NURSE PRACTITIONER

## 2023-08-15 PROCEDURE — 1159F PR MEDICATION LIST DOCUMENTED IN MEDICAL RECORD: ICD-10-PCS | Mod: CPTII,,, | Performed by: NURSE PRACTITIONER

## 2023-08-15 PROCEDURE — 3046F HEMOGLOBIN A1C LEVEL >9.0%: CPT | Mod: CPTII,,, | Performed by: NURSE PRACTITIONER

## 2023-08-15 PROCEDURE — 3066F PR DOCUMENTATION OF TREATMENT FOR NEPHROPATHY: ICD-10-PCS | Mod: CPTII,,, | Performed by: NURSE PRACTITIONER

## 2023-08-15 PROCEDURE — 95251 PR GLUCOSE MONITOR, 72 HOUR, PHYS INTERP: ICD-10-PCS | Mod: ,,, | Performed by: NURSE PRACTITIONER

## 2023-08-15 PROCEDURE — 3074F SYST BP LT 130 MM HG: CPT | Mod: CPTII,,, | Performed by: NURSE PRACTITIONER

## 2023-08-15 PROCEDURE — 3078F DIAST BP <80 MM HG: CPT | Mod: CPTII,,, | Performed by: NURSE PRACTITIONER

## 2023-08-15 PROCEDURE — 3008F BODY MASS INDEX DOCD: CPT | Mod: CPTII,,, | Performed by: NURSE PRACTITIONER

## 2023-08-15 PROCEDURE — 1159F MED LIST DOCD IN RCRD: CPT | Mod: CPTII,,, | Performed by: NURSE PRACTITIONER

## 2023-08-15 PROCEDURE — 95251 CONT GLUC MNTR ANALYSIS I&R: CPT | Mod: ,,, | Performed by: NURSE PRACTITIONER

## 2023-08-15 NOTE — PROGRESS NOTES
Subjective:       Patient ID: Carmelo High is a 33 y.o. male.    Chief Complaint:  Type 1 Diabetes.    Pt is a 33 y.o. AAM  with a diagnosis of Type 1 diabetes mellitus diagnosed approximately 2019 ,after feeling horrible, with admit for glucose > 500.  Pt with freq admits for DKA.  as well as chronic conditions pending review including none .  Other pertinent medical and social information noted includes, but not limited to: None.         Interim Events: August 15, 2023:   No acute events or focal complaints.  Sensor downloaded. He did have a hypoglycemic episode about 5:30 this AM.  Did not take any Humalog for supper, so downward drift definitely r/t Lantus.  Still not doing much for counting carbs.  If he has a salad does not take humalog  (which looks appropriate as scant prandial increase.  Not some near/mild hypoglycemia in evening--which is overtreated, and then he may hold Humalog wit supper as well. He has also not been taking his atorvastatin       Current DM meds:   Lantus 30 qhs, Humalog 12-12-12 plus ss        Failed DM meds:  na        Back up plan for insulin pump hiatus:   Statin: 40 mg atorvastatin        Not tolerated statin : na   ACE/ARB:none        Not tolerated ACE/ARB: na   Known Diabetic complications: none         Passwords for [employer]:       May 12, 2023:  NO acute events. States glucoses are erratic. But states he knows it is diet, and when he does have hypoglycemia he grossly overcompensations.  No other focal compalints.      CGMS Interpretation:           Sensor/Pump Interpretation or Prominent Theme: Consistent downward drift in glucoses overnight.  Erratic during day r/t carb/insulin mismatch and holding humalog.      FEb 7, 2023:  No acute events.  Issues with sensor supplies so not wearing last couple of weeks.  Though sensor downloaded and reviewed. Glucoses erratic.  Not doing official l carb counting, but more of an guess based on experience.  Recognizes he  overcompensates with hypoglycemia treatment.  States generally compliant with injections--though may fall asleep and not get lantus until middle of night.        10/26/2022--no acute events.  Been waiting on Dexcom supplies for last 2 weeks.  Is SBGM.  States worse without the sensor. No c/o hypoglycemia or other focal complaints.  He did not see Diabetes Ed to get a handle on carb counting, which he will need to be relatively fluent in to optimize new pump technology.       7/28/2022 No acute events.  Using sensor.  DM control has improved.  Needs more work on carb counting.  Note a few mild/near hyopglycemic excursions.  Seems he is very activity sensitive.  No focal complaints. 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   He is starting with a base of Humalog 12 and may hedge dose up or down several units based on how many carbs he thinks he is eating and then adding correction amount to that--which is a better way to do it---but he could still use some improvement.     Pt reports he is [id number]  lantus qhs.   Humalog 12 with meals plus ss.     Sensor Interpretation             Prominent Theme/Finding: Overall erratic.  Marked prandial excursions with precipitous drops to  range.      Review of Systems   Constitutional:  Negative for appetite change and unexpected weight change.   HENT:  Negative for hearing loss and trouble swallowing.    Eyes:  Positive for visual disturbance. Negative for photophobia.   Respiratory:  Negative for cough and shortness of breath.    Cardiovascular:  Negative for chest pain and leg swelling.   Gastrointestinal:  Negative for constipation and diarrhea.   Genitourinary:  Negative for difficulty urinating and urgency.   Musculoskeletal:  Negative for arthralgias and myalgias.   Neurological:  Negative for weakness and numbness.   Psychiatric/Behavioral:  Negative for sleep disturbance. The patient is not nervous/anxious.          Objective:      Physical Exam  Constitutional:        Appearance: Normal appearance.   HENT:      Head: Normocephalic and atraumatic.      Nose: Nose normal.      Mouth/Throat:      Mouth: Mucous membranes are moist.      Pharynx: Oropharynx is clear.   Eyes:      Extraocular Movements: Extraocular movements intact.      Conjunctiva/sclera: Conjunctivae normal.      Pupils: Pupils are equal, round, and reactive to light.   Neck:      Vascular: No carotid bruit.   Cardiovascular:      Rate and Rhythm: Normal rate and regular rhythm.      Pulses: Normal pulses.      Heart sounds: Normal heart sounds.   Pulmonary:      Effort: Pulmonary effort is normal.      Breath sounds: Normal breath sounds.   Musculoskeletal:         General: Normal range of motion.      Cervical back: Normal range of motion and neck supple.      Right lower leg: No edema.      Left lower leg: No edema.      Comments: Deferred   Feet: no open wounds or calluses.    Onychomycosis.   Pedal pulses +1 bialterally   Vibratory sensation intact bilaterally.    Lymphadenopathy:      Cervical: No cervical adenopathy.   Skin:     General: Skin is warm and dry.   Neurological:      General: No focal deficit present.      Mental Status: He is alert and oriented to person, place, and time.   Psychiatric:         Mood and Affect: Mood normal.         Behavior: Behavior normal.         Thought Content: Thought content normal.         Judgment: Judgment normal.           There were no vitals taken for this visit.    Hemoglobin A1C   Date Value Ref Range Status   08/03/2023 11.1 (H) 4.0 - 5.6 % Final     Comment:     ADA Screening Guidelines:  5.7-6.4%  Consistent with prediabetes  >or=6.5%  Consistent with diabetes    High levels of fetal hemoglobin interfere with the HbA1C  assay. Heterozygous hemoglobin variants (HbS, HgC, etc)do  not significantly interfere with this assay.   However, presence of multiple variants may affect accuracy.     05/04/2023 10.8 (H) 4.0 - 5.6 % Final     Comment:     ADA Screening  "Guidelines:  5.7-6.4%  Consistent with prediabetes  >or=6.5%  Consistent with diabetes    High levels of fetal hemoglobin interfere with the HbA1C  assay. Heterozygous hemoglobin variants (HbS, HgC, etc)do  not significantly interfere with this assay.   However, presence of multiple variants may affect accuracy.     02/02/2023 10.1 (H) 4.0 - 5.6 % Final     Comment:     ADA Screening Guidelines:  5.7-6.4%  Consistent with prediabetes  >or=6.5%  Consistent with diabetes    High levels of fetal hemoglobin interfere with the HbA1C  assay. Heterozygous hemoglobin variants (HbS, HgC, etc)do  not significantly interfere with this assay.   However, presence of multiple variants may affect accuracy.         Chemistry        Component Value Date/Time     08/03/2023 0846    K 5.4 (H) 08/03/2023 0846     08/03/2023 0846    CO2 22 (L) 08/03/2023 0846    BUN 20 08/03/2023 0846    CREATININE 1.1 08/03/2023 0846     (H) 08/03/2023 0846        Component Value Date/Time    CALCIUM 10.0 08/03/2023 0846    ALKPHOS 119 08/03/2023 0846    AST 17 08/03/2023 0846    ALT 14 08/03/2023 0846    BILITOT 1.8 (H) 08/03/2023 0846    ESTGFRAFRICA >60.0 11/30/2021 0854    EGFRNONAA >60.0 11/30/2021 0854          Lab Results   Component Value Date    CHOL 212 (H) 08/03/2023     Lab Results   Component Value Date    HDL 50 08/03/2023     Lab Results   Component Value Date    LDLCALC 144.2 08/03/2023     Lab Results   Component Value Date    TRIG 89 08/03/2023     Lab Results   Component Value Date    CHOLHDL 23.6 08/03/2023     Lab Results   Component Value Date    MICALBCREAT Unable to calculate 08/03/2023     Lab Results   Component Value Date    TSH 1.344 11/30/2021     No results found for: "[id number]"    Assessment:       1. Type 1 diabetes mellitus without complication        2. Dyslipidemia            Plan:         Decrease lantus from 30 to 25 units---  rx for 50 to allow for interim titrations.  ----increase to 35 " units.   Cont Humalog 12 plus ss for now.    He needs to work on carb counting more--though he has seen education. ---again.   Advised when he is feeling motivated to  enter glucose and insulin in sensor harsh, and keep a food log and we can try to fine tune him, but I suspect he is missing shots and taking them way to late causing preciptious glucose decrease.   Cont  atorvastatin 20 mg      Restart atorvastatin       ORDERS 08/15/2023      3 mo  with a1c.

## 2023-08-15 NOTE — PATIENT INSTRUCTIONS
Decrease  30 lantus qhs,   to 25 units.  rx for 50 to allow for interim titrations.  ---      Cont Humalog 12 plus ss for now.    He needs to work on carb counting more--though he has seen education. ---again.   Advised when he is feeling motivated to  enter glucose and insulin in sensor harsh, and keep a food log and we can try to fine tune him, but I suspect he is missing shots and taking them way to late causing preciptious glucose decrease.   Cont  atorvastatin 20 mg     Reinforced looking at labels for carb content to dose smarter.

## 2024-01-30 DIAGNOSIS — E10.9 TYPE 1 DIABETES MELLITUS WITHOUT COMPLICATION: Primary | ICD-10-CM

## 2024-01-30 RX ORDER — BLOOD-GLUCOSE SENSOR
EACH MISCELLANEOUS
Qty: 9 EACH | Refills: 3 | Status: SHIPPED | OUTPATIENT
Start: 2024-01-30 | End: 2024-03-28 | Stop reason: ALTCHOICE

## 2024-01-30 RX ORDER — BLOOD-GLUCOSE TRANSMITTER
EACH MISCELLANEOUS
Qty: 1 EACH | Refills: 3 | Status: SHIPPED | OUTPATIENT
Start: 2024-01-30

## 2024-01-30 NOTE — TELEPHONE ENCOUNTER
----- Message from Mely Echols sent at 1/30/2024  9:04 AM CST -----  Regarding: refill dexcom  Contact: Carmelo  [phone]  Type:  RX Refill Request    Who Called:  Carmelo    Refill or New Rx:  refill    RX Name and Strength:  blood-glucose sensor (DEXCOM G6 SENSOR) Roxy 9 each each 1/4/2022 -   Sig: Please use to monitor your blood glucose and keep log   Class: Print     blood-glucose transmitter (DEXCOM G5 TRANSMITTER) Royx 3 each 3 1/4/2022 -   Sig: Please use to check your blood sugar in the morning before meals, pre-meals prior to insulin administration, and 1-2 hours post-meals. Keep log to bring to clinic.   Sent to pharmacy as: blood-glucose transmitter (DEXCOM G5 TRANSMITTER) Roxy   E-Prescribing Status: Receipt confirmed by pharmacy (1/4/2022  7:14 AM CST)         How is the patient currently taking it? (ex. 1XDay):  see above    Is this a 30 day or 90 day RX:  see above    Preferred Pharmacy with phone number:    [facility] out of [place]      Local or Mail Order:  MAIL    Ordering Provider:  Juan Martínez Call Back Number:  [phone]    Additional Information:  Patient says that this is usually mailed from somewhere called [facility] based out of [place] for them. Did not see this on pharm list. 2 Dexcom refills, please call to let them know when order is placed.

## 2024-01-30 NOTE — TELEPHONE ENCOUNTER
Attempted to contact pt. Number not working. As of Jan 1 all Dexcom regardless of insurance now goes to pharmacy. Sending Dexcom refills to pts pharmacy on file.

## 2024-03-21 DIAGNOSIS — E10.9 TYPE 1 DIABETES MELLITUS WITHOUT COMPLICATION: ICD-10-CM

## 2024-03-21 NOTE — TELEPHONE ENCOUNTER
----- Message from Emani Sanbaria sent at 3/21/2024 10:59 AM CDT -----  Contact: self  Type:  RX Refill Request    Who Called: Pt  Refill or New Rx:Refill   RX Name and Strength:insulin lispro (HUMALOG KWIKPEN INSULIN) 100 unit/mL pen  How is the patient currently taking it? (ex. 1XDay): as directed  Is this a 30 day or 90 day RX:   Preferred Pharmacy with phone number:   [id number] - [address] [address]  Phone: [phone] Fax: [phone]  Local or Mail Order: Local  Ordering Provider: ERASMO Wooten,ANP-C  Would the patient rather a call back or a response via [facility]? Call  Best Call Back Number: [phone]  Pt has an appt on 3/28/24  Thank you

## 2024-03-22 RX ORDER — INSULIN LISPRO 100 [IU]/ML
INJECTION, SOLUTION INTRAVENOUS; SUBCUTANEOUS
Qty: 12 ML | Refills: 12 | Status: SHIPPED | OUTPATIENT
Start: 2024-03-22

## 2024-03-26 RX ORDER — INSULIN LISPRO 100 [IU]/ML
INJECTION, SOLUTION INTRAVENOUS; SUBCUTANEOUS
Qty: 15 ML | Refills: 11 | Status: SHIPPED | OUTPATIENT
Start: 2024-03-26

## 2024-03-28 ENCOUNTER — OFFICE VISIT (OUTPATIENT)
Dept: ENDOCRINOLOGY | Facility: CLINIC | Age: 34
End: 2024-03-28
Payer: MEDICAID

## 2024-03-28 VITALS
HEIGHT: 70 IN | HEART RATE: 65 BPM | WEIGHT: 160.81 LBS | DIASTOLIC BLOOD PRESSURE: 88 MMHG | BODY MASS INDEX: 23.02 KG/M2 | OXYGEN SATURATION: 95 % | SYSTOLIC BLOOD PRESSURE: 123 MMHG

## 2024-03-28 DIAGNOSIS — E10.9 TYPE 1 DIABETES MELLITUS WITHOUT COMPLICATION: Primary | ICD-10-CM

## 2024-03-28 PROCEDURE — 99213 OFFICE O/P EST LOW 20 MIN: CPT | Mod: PBBFAC,PO | Performed by: NURSE PRACTITIONER

## 2024-03-28 PROCEDURE — 3079F DIAST BP 80-89 MM HG: CPT | Mod: CPTII,,, | Performed by: NURSE PRACTITIONER

## 2024-03-28 PROCEDURE — 3074F SYST BP LT 130 MM HG: CPT | Mod: CPTII,,, | Performed by: NURSE PRACTITIONER

## 2024-03-28 PROCEDURE — 99214 OFFICE O/P EST MOD 30 MIN: CPT | Mod: S$PBB,,, | Performed by: NURSE PRACTITIONER

## 2024-03-28 PROCEDURE — 3008F BODY MASS INDEX DOCD: CPT | Mod: CPTII,,, | Performed by: NURSE PRACTITIONER

## 2024-03-28 PROCEDURE — 99999 PR PBB SHADOW E&M-EST. PATIENT-LVL III: CPT | Mod: PBBFAC,,, | Performed by: NURSE PRACTITIONER

## 2024-03-28 PROCEDURE — 1159F MED LIST DOCD IN RCRD: CPT | Mod: CPTII,,, | Performed by: NURSE PRACTITIONER

## 2024-03-28 RX ORDER — BLOOD-GLUCOSE SENSOR
1 EACH MISCELLANEOUS
Qty: 3 EACH | Refills: 12 | Status: SHIPPED | OUTPATIENT
Start: 2024-03-28 | End: 2025-03-28

## 2024-03-28 RX ORDER — PANTOPRAZOLE SODIUM 40 MG/1
1 TABLET, DELAYED RELEASE ORAL EVERY MORNING
COMMUNITY
Start: 2023-10-05 | End: 2024-10-04

## 2024-03-28 NOTE — PROGRESS NOTES
Subjective:       Patient ID: Carmelo High is a 33 y.o. male.    Chief Complaint:  Type 1 Diabetes.    Pt is a 33 y.o. AAM  with a diagnosis of Type 1 diabetes mellitus diagnosed approximately 2019 ,after feeling horrible, with admit for glucose > 500.  Pt with freq admits for DKA.  as well as chronic conditions pending review including none .  Other pertinent medical and social information noted includes, but not limited to: None.         Interim Events: march 28, 2024:  Hadnt been using sensors r/t refill issues.  Left glucometer at [facility] so not really checking the for the last week. Expresses concerns he may need cardiologist.  When he goes to [facility] and gets in chair and they put cape on he gets very sweaty, lightheaded and needs to stop. Has checked glucoses at these time, and states ok.  He is known for marked claustrophobia.  HE does state sometimes when getting out of bed too fast he will get very dizzy.      Checked glucose last night---HI---        Current DM meds:   Lantus 25  qhs, Humalog 12-12-12 plus ss        Failed DM meds:  na        Back up plan for insulin pump hiatus:   Statin: 40 mg atorvastatin        Not tolerated statin : na   ACE/ARB:none        Not tolerated ACE/ARB: na   Known Diabetic complications: none         Passwords for Tech Accounts:     CGMS Interpretation:       Sensor/Pump Interpretation or Prominent Theme:   August 15, 2023:   No acute events or focal complaints.  Sensor downloaded. He did have a hypoglycemic episode about 5:30 this AM.  Did not take any Humalog for supper, so downward drift definitely r/t Lantus.  Still not doing much for counting carbs.  If he has a salad does not take humalog  (which looks appropriate as scant prandial increase.  Not some near/mild hypoglycemia in evening--which is overtreated, and then he may hold Humalog wit supper as well. He has also not been taking his atorvastatin       May 12, 2023:  NO acute events. States glucoses are  erratic. But states he knows it is diet, and when he does have hypoglycemia he grossly overcompensations.  No other focal compalints.      FEb 7, 2023:  No acute events.  Issues with sensor supplies so not wearing last couple of weeks.  Though sensor downloaded and reviewed. Glucoses erratic.  Not doing official l carb counting, but more of an guess based on experience.  Recognizes he overcompensates with hypoglycemia treatment.  States generally compliant with injections--though may fall asleep and not get lantus until middle of night.        10/26/2022--no acute events.  Been waiting on Dexcom supplies for last 2 weeks.  Is SBGM.  States worse without the sensor. No c/o hypoglycemia or other focal complaints.  He did not see Diabetes Ed to get a handle on carb counting, which he will need to be relatively fluent in to optimize new pump technology.       7/28/2022 No acute events.  Using sensor.  DM control has improved.  Needs more work on carb counting.  Note a few mild/near hyopglycemic excursions.  Seems he is very activity sensitive.  No focal complaints. 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   He is starting with a base of Humalog 12 and may hedge dose up or down several units based on how many carbs he thinks he is eating and then adding correction amount to that--which is a better way to do it---but he could still use some improvement.     Review of Systems   Constitutional:  Negative for appetite change and unexpected weight change.   HENT:  Negative for hearing loss and trouble swallowing.    Eyes:  Negative for photophobia and visual disturbance.   Respiratory:  Negative for cough and shortness of breath.    Cardiovascular:  Negative for chest pain and leg swelling.   Gastrointestinal:  Negative for constipation and diarrhea.   Genitourinary:  Negative for difficulty urinating and urgency.   Musculoskeletal:  Negative for arthralgias and myalgias.   Neurological:  Positive for light-headedness. Negative  "for weakness and numbness.   Psychiatric/Behavioral:  Negative for sleep disturbance. The patient is not nervous/anxious.    All other systems reviewed and are negative.        Objective:      Physical Exam  Constitutional:       Appearance: Normal appearance.   HENT:      Head: Normocephalic and atraumatic.      Nose: Nose normal.      Mouth/Throat:      Mouth: Mucous membranes are moist.      Pharynx: Oropharynx is clear.   Eyes:      Extraocular Movements: Extraocular movements intact.      Conjunctiva/sclera: Conjunctivae normal.      Pupils: Pupils are equal, round, and reactive to light.   Neck:      Vascular: No carotid bruit.   Cardiovascular:      Rate and Rhythm: Normal rate and regular rhythm.      Pulses: Normal pulses.      Heart sounds: Normal heart sounds.   Pulmonary:      Effort: Pulmonary effort is normal.      Breath sounds: Normal breath sounds.   Musculoskeletal:         General: Normal range of motion.      Cervical back: Normal range of motion and neck supple.      Right lower leg: No edema.      Left lower leg: No edema.      Comments: Deferred   Feet: no open wounds or calluses.    Onychomycosis.   Pedal pulses +1 bialterally   Vibratory sensation intact bilaterally.    Lymphadenopathy:      Cervical: No cervical adenopathy.   Skin:     General: Skin is warm and dry.   Neurological:      General: No focal deficit present.      Mental Status: He is alert and oriented to person, place, and time.   Psychiatric:         Mood and Affect: Mood normal.         Behavior: Behavior normal.         Thought Content: Thought content normal.         Judgment: Judgment normal.           /88 (BP Location: Right arm, Patient Position: Sitting, BP Method: Medium (Automatic))   Pulse 65   Ht 5' 10" (1.778 m)   Wt 73 kg (160 lb 13.2 oz)   SpO2 95%   BMI 23.08 kg/m²     Hemoglobin A1C   Date Value Ref Range Status   08/03/2023 11.1 (H) 4.0 - 5.6 % Final     Comment:     ADA Screening " Guidelines:  5.7-6.4%  Consistent with prediabetes  >or=6.5%  Consistent with diabetes    High levels of fetal hemoglobin interfere with the HbA1C  assay. Heterozygous hemoglobin variants (HbS, HgC, etc)do  not significantly interfere with this assay.   However, presence of multiple variants may affect accuracy.     05/04/2023 10.8 (H) 4.0 - 5.6 % Final     Comment:     ADA Screening Guidelines:  5.7-6.4%  Consistent with prediabetes  >or=6.5%  Consistent with diabetes    High levels of fetal hemoglobin interfere with the HbA1C  assay. Heterozygous hemoglobin variants (HbS, HgC, etc)do  not significantly interfere with this assay.   However, presence of multiple variants may affect accuracy.     02/02/2023 10.1 (H) 4.0 - 5.6 % Final     Comment:     ADA Screening Guidelines:  5.7-6.4%  Consistent with prediabetes  >or=6.5%  Consistent with diabetes    High levels of fetal hemoglobin interfere with the HbA1C  assay. Heterozygous hemoglobin variants (HbS, HgC, etc)do  not significantly interfere with this assay.   However, presence of multiple variants may affect accuracy.         Chemistry        Component Value Date/Time     09/25/2023 0651     08/03/2023 0846    K 4.3 09/25/2023 0651    K 5.4 (H) 08/03/2023 0846     09/25/2023 0651     08/03/2023 0846    CO2 23 09/25/2023 0651    CO2 22 (L) 08/03/2023 0846    BUN 16 09/25/2023 0651    BUN 20 08/03/2023 0846    CREATININE 1.13 09/25/2023 0651    CREATININE 1.1 08/03/2023 0846     (H) 08/03/2023 0846        Component Value Date/Time    CALCIUM 8.9 09/25/2023 0651    CALCIUM 10.0 08/03/2023 0846    ALKPHOS 119 08/03/2023 0846    AST 17 08/03/2023 0846    ALT 14 08/03/2023 0846    BILITOT 1.8 (H) 08/03/2023 0846    ESTGFRAFRICA 89 09/25/2023 0651    ESTGFRAFRICA >60.0 11/30/2021 0854    EGFRNONAA >60.0 11/30/2021 0854          Lab Results   Component Value Date    CHOL 212 (H) 08/03/2023     Lab Results   Component Value Date    HDL 50  "08/03/2023     Lab Results   Component Value Date    LDLCALC 144.2 08/03/2023     Lab Results   Component Value Date    TRIG 89 08/03/2023     Lab Results   Component Value Date    CHOLHDL 23.6 08/03/2023     Lab Results   Component Value Date    MICALBCREAT Unable to calculate 08/03/2023     Lab Results   Component Value Date    TSH 1.344 11/30/2021     No results found for: "[id number]"    Assessment:       1. Type 1 diabetes mellitus without complication  blood-glucose sensor (DEXCOM G7 SENSOR) Roxy    Comprehensive Metabolic Panel    Hemoglobin A1C    Lipid Panel    Microalbumin/Creatinine Ratio, Urine    TSH    Celiac Disease Panel            Plan:       Continue  25 units---  rx for 50 to allow for interim titrations.  ----  Cont Humalog 12 plus ss for now.     Cont  atorvastatin 20 mg      Advised to f./u primary regarding lightheadness    ORDERS 03/28/2024      3 mo  with  fasting cmp, lipids, A1c, TSH, celiac antibodies, urine m/c                           "

## 2024-04-15 DIAGNOSIS — E10.9 TYPE 1 DIABETES MELLITUS WITHOUT COMPLICATION: ICD-10-CM

## 2024-04-16 RX ORDER — INSULIN GLARGINE 100 [IU]/ML
INJECTION, SOLUTION SUBCUTANEOUS
Qty: 48 ML | Refills: 6 | Status: SHIPPED | OUTPATIENT
Start: 2024-04-16

## 2024-05-20 RX ORDER — PEN NEEDLE, DIABETIC 31 GX5/16"
NEEDLE, DISPOSABLE MISCELLANEOUS
Qty: 100 EACH | Refills: 4 | Status: SHIPPED | OUTPATIENT
Start: 2024-05-20

## 2024-07-31 DIAGNOSIS — E10.9 TYPE 1 DIABETES MELLITUS WITHOUT COMPLICATION: ICD-10-CM

## 2024-07-31 RX ORDER — BLOOD-GLUCOSE SENSOR
1 EACH MISCELLANEOUS
Qty: 3 EACH | Refills: 12 | Status: SHIPPED | OUTPATIENT
Start: 2024-07-31 | End: 2025-07-31

## 2024-07-31 NOTE — TELEPHONE ENCOUNTER
----- Message from Vicki Guajardo sent at 7/31/2024 12:05 PM CDT -----  Type: Needs Medical Advice  Who Called:  pt   Best Call Back Number: [phone]    Additional Information: pt stated he would like to be advised in regards to deepika pts missed appt please ensure to call back asap thanks!

## 2024-07-31 NOTE — TELEPHONE ENCOUNTER
S/w pt needed to r/s appt from yesterday.Pt r/s to September. Pt asking for refill on dexcom sensors

## 2024-08-16 DIAGNOSIS — E10.9 TYPE 1 DIABETES MELLITUS WITHOUT COMPLICATION: ICD-10-CM

## 2024-08-19 RX ORDER — PEN NEEDLE, DIABETIC 30 GX3/16"
NEEDLE, DISPOSABLE MISCELLANEOUS
Qty: 100 EACH | Refills: 12 | Status: SHIPPED | OUTPATIENT
Start: 2024-08-19

## 2024-09-06 ENCOUNTER — OFFICE VISIT (OUTPATIENT)
Dept: ENDOCRINOLOGY | Facility: CLINIC | Age: 34
End: 2024-09-06

## 2024-09-06 ENCOUNTER — LAB VISIT (OUTPATIENT)
Dept: LAB | Facility: HOSPITAL | Age: 34
End: 2024-09-06
Attending: NURSE PRACTITIONER

## 2024-09-06 ENCOUNTER — TELEPHONE (OUTPATIENT)
Dept: PHARMACY | Facility: CLINIC | Age: 34
End: 2024-09-06

## 2024-09-06 VITALS
HEIGHT: 70 IN | BODY MASS INDEX: 22.15 KG/M2 | WEIGHT: 154.75 LBS | DIASTOLIC BLOOD PRESSURE: 68 MMHG | SYSTOLIC BLOOD PRESSURE: 126 MMHG | HEART RATE: 59 BPM

## 2024-09-06 DIAGNOSIS — E10.9 TYPE 1 DIABETES MELLITUS WITHOUT COMPLICATION: Primary | ICD-10-CM

## 2024-09-06 DIAGNOSIS — E10.9 TYPE 1 DIABETES MELLITUS WITHOUT COMPLICATION: ICD-10-CM

## 2024-09-06 LAB
ALBUMIN SERPL BCP-MCNC: 4 G/DL (ref 3.5–5.2)
ALP SERPL-CCNC: 143 U/L (ref 55–135)
ALT SERPL W/O P-5'-P-CCNC: 22 U/L (ref 10–44)
ANION GAP SERPL CALC-SCNC: 9 MMOL/L (ref 8–16)
AST SERPL-CCNC: 19 U/L (ref 10–40)
BILIRUB SERPL-MCNC: 1.5 MG/DL (ref 0.1–1)
BUN SERPL-MCNC: 10 MG/DL (ref 6–20)
CALCIUM SERPL-MCNC: 9.7 MG/DL (ref 8.7–10.5)
CHLORIDE SERPL-SCNC: 100 MMOL/L (ref 95–110)
CHOLEST SERPL-MCNC: 164 MG/DL (ref 120–199)
CHOLEST/HDLC SERPL: 4.2 {RATIO} (ref 2–5)
CO2 SERPL-SCNC: 28 MMOL/L (ref 23–29)
CREAT SERPL-MCNC: 1.1 MG/DL (ref 0.5–1.4)
EST. GFR  (NO RACE VARIABLE): >60 ML/MIN/1.73 M^2
ESTIMATED AVG GLUCOSE: ABNORMAL MG/DL (ref 68–131)
GLUCOSE SERPL-MCNC: 337 MG/DL (ref 70–110)
HBA1C MFR BLD: >14 % (ref 4–5.6)
HDLC SERPL-MCNC: 39 MG/DL (ref 40–75)
HDLC SERPL: 23.8 % (ref 20–50)
LDLC SERPL CALC-MCNC: 104 MG/DL (ref 63–159)
NONHDLC SERPL-MCNC: 125 MG/DL
POTASSIUM SERPL-SCNC: 4.3 MMOL/L (ref 3.5–5.1)
PROT SERPL-MCNC: 7 G/DL (ref 6–8.4)
SODIUM SERPL-SCNC: 137 MMOL/L (ref 136–145)
TRIGL SERPL-MCNC: 105 MG/DL (ref 30–150)
TSH SERPL DL<=0.005 MIU/L-ACNC: 1.87 UIU/ML (ref 0.4–4)

## 2024-09-06 PROCEDURE — 99213 OFFICE O/P EST LOW 20 MIN: CPT | Mod: PBBFAC,PO | Performed by: NURSE PRACTITIONER

## 2024-09-06 PROCEDURE — 99999 PR PBB SHADOW E&M-EST. PATIENT-LVL III: CPT | Mod: PBBFAC,,, | Performed by: NURSE PRACTITIONER

## 2024-09-06 PROCEDURE — 80061 LIPID PANEL: CPT | Performed by: NURSE PRACTITIONER

## 2024-09-06 PROCEDURE — 83036 HEMOGLOBIN GLYCOSYLATED A1C: CPT | Performed by: NURSE PRACTITIONER

## 2024-09-06 PROCEDURE — 86258 DGP ANTIBODY EACH IG CLASS: CPT | Performed by: NURSE PRACTITIONER

## 2024-09-06 PROCEDURE — 86364 TISS TRNSGLTMNASE EA IG CLAS: CPT | Mod: 59 | Performed by: NURSE PRACTITIONER

## 2024-09-06 PROCEDURE — 84443 ASSAY THYROID STIM HORMONE: CPT | Performed by: NURSE PRACTITIONER

## 2024-09-06 PROCEDURE — 36415 COLL VENOUS BLD VENIPUNCTURE: CPT | Mod: PO | Performed by: NURSE PRACTITIONER

## 2024-09-06 PROCEDURE — 80053 COMPREHEN METABOLIC PANEL: CPT | Performed by: NURSE PRACTITIONER

## 2024-09-06 RX ORDER — INSULIN DEGLUDEC 100 U/ML
25 INJECTION, SOLUTION SUBCUTANEOUS DAILY
Start: 2024-09-06

## 2024-09-06 RX ORDER — INSULIN ASPART 100 [IU]/ML
INJECTION, SOLUTION INTRAVENOUS; SUBCUTANEOUS
Start: 2024-09-06

## 2024-09-06 NOTE — PROGRESS NOTES
Subjective:       Patient ID: Carmelo High is a 34 y.o. male.    Chief Complaint:  Type 1 Diabetes.    Pt is a 34 y.o. AAM  with a diagnosis of Type 1 diabetes mellitus diagnosed approximately 2019 ,after feeling horrible, with admit for glucose > 500.  Pt with freq admits for DKA.  as well as chronic conditions pending review including none .  Other pertinent medical and social information noted includes, but not limited to: None.         Interim Events: Sept 6, 2024:  Currently without insurance.  So not using sensor. Checks glucoses usually qd, states FBS usually . No hypoglycemia.  He has been paying  cash for insulin.  No acute events or focal complaints. Did not do labs.      Current DM meds:   Lantus 25  qhs, Humalog 12-12-12 plus ss        Failed DM meds:  na        Back up plan for insulin pump hiatus:   Statin: 40 mg atorvastatin        Not tolerated statin : na   ACE/ARB:none        Not tolerated ACE/ARB: na   Known Diabetic complications: none         Passwords for Tech Accounts:       Fbs .      march 28, 2024:  Hadnt been using sensors r/t refill issues.  Left glucometer at [facility] so not really checking the for the last week. Expresses concerns he may need cardiologist.  When he goes to [facility] and gets in chair and they put cape on he gets very sweaty, lightheaded and needs to stop. Has checked glucoses at these time, and states ok.  He is known for marked claustrophobia.  HE does state sometimes when getting out of bed too fast he will get very dizzy.      Checked glucose last night---HI---    August 15, 2023:   No acute events or focal complaints.  Sensor downloaded. He did have a hypoglycemic episode about 5:30 this AM.  Did not take any Humalog for supper, so downward drift definitely r/t Lantus.  Still not doing much for counting carbs.  If he has a salad does not take humalog  (which looks appropriate as scant prandial increase.  Not some near/mild hypoglycemia in  evening--which is overtreated, and then he may hold Humalog wit supper as well. He has also not been taking his atorvastatin       May 12, 2023:  NO acute events. States glucoses are erratic. But states he knows it is diet, and when he does have hypoglycemia he grossly overcompensations.  No other focal compalints.      FEb 7, 2023:  No acute events.  Issues with sensor supplies so not wearing last couple of weeks.  Though sensor downloaded and reviewed. Glucoses erratic.  Not doing official l carb counting, but more of an guess based on experience.  Recognizes he overcompensates with hypoglycemia treatment.  States generally compliant with injections--though may fall asleep and not get lantus until middle of night.        10/26/2022--no acute events.  Been waiting on Dexcom supplies for last 2 weeks.  Is SBGM.  States worse without the sensor. No c/o hypoglycemia or other focal complaints.  He did not see Diabetes Ed to get a handle on carb counting, which he will need to be relatively fluent in to optimize new pump technology.       7/28/2022 No acute events.  Using sensor.  DM control has improved.  Needs more work on carb counting.  Note a few mild/near hyopglycemic excursions.  Seems he is very activity sensitive.  No focal complaints. 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   He is starting with a base of Humalog 12 and may hedge dose up or down several units based on how many carbs he thinks he is eating and then adding correction amount to that--which is a better way to do it---but he could still use some improvement.     Review of Systems   Constitutional:  Negative for appetite change and unexpected weight change.   HENT:  Negative for hearing loss and trouble swallowing.    Eyes:  Negative for photophobia and visual disturbance.   Respiratory:  Negative for cough and shortness of breath.    Cardiovascular:  Negative for chest pain and leg swelling.   Gastrointestinal:  Negative for constipation and  "diarrhea.   Genitourinary:  Negative for difficulty urinating and urgency.   Musculoskeletal:  Negative for arthralgias and myalgias.   Neurological:  Negative for weakness, light-headedness and numbness.   Psychiatric/Behavioral:  Negative for sleep disturbance. The patient is not nervous/anxious.    All other systems reviewed and are negative.        Objective:      Physical Exam  Constitutional:       Appearance: Normal appearance.   HENT:      Head: Normocephalic and atraumatic.      Nose: Nose normal.      Mouth/Throat:      Mouth: Mucous membranes are moist.      Pharynx: Oropharynx is clear.   Eyes:      Extraocular Movements: Extraocular movements intact.      Conjunctiva/sclera: Conjunctivae normal.      Pupils: Pupils are equal, round, and reactive to light.   Neck:      Vascular: No carotid bruit.   Cardiovascular:      Rate and Rhythm: Normal rate and regular rhythm.      Pulses: Normal pulses.      Heart sounds: Normal heart sounds.   Pulmonary:      Effort: Pulmonary effort is normal.      Breath sounds: Normal breath sounds.   Musculoskeletal:         General: Normal range of motion.      Cervical back: Normal range of motion and neck supple.      Right lower leg: No edema.      Left lower leg: No edema.      Comments: Deferred   Feet: no open wounds or calluses.    Onychomycosis.   Pedal pulses +1 bialterally   Vibratory sensation intact bilaterally.    Lymphadenopathy:      Cervical: No cervical adenopathy.   Skin:     General: Skin is warm and dry.   Neurological:      General: No focal deficit present.      Mental Status: He is alert and oriented to person, place, and time.   Psychiatric:         Mood and Affect: Mood normal.         Behavior: Behavior normal.         Thought Content: Thought content normal.         Judgment: Judgment normal.         /68 (BP Location: Right arm, Patient Position: Sitting, BP Method: Large (Manual))   Pulse (!) 59   Ht 5' 10" (1.778 m)   Wt 70.2 kg (154 lb " 12.2 oz)   BMI 22.21 kg/m²     Hemoglobin A1C   Date Value Ref Range Status   08/03/2023 11.1 (H) 4.0 - 5.6 % Final     Comment:     ADA Screening Guidelines:  5.7-6.4%  Consistent with prediabetes  >or=6.5%  Consistent with diabetes    High levels of fetal hemoglobin interfere with the HbA1C  assay. Heterozygous hemoglobin variants (HbS, HgC, etc)do  not significantly interfere with this assay.   However, presence of multiple variants may affect accuracy.     05/04/2023 10.8 (H) 4.0 - 5.6 % Final     Comment:     ADA Screening Guidelines:  5.7-6.4%  Consistent with prediabetes  >or=6.5%  Consistent with diabetes    High levels of fetal hemoglobin interfere with the HbA1C  assay. Heterozygous hemoglobin variants (HbS, HgC, etc)do  not significantly interfere with this assay.   However, presence of multiple variants may affect accuracy.     02/02/2023 10.1 (H) 4.0 - 5.6 % Final     Comment:     ADA Screening Guidelines:  5.7-6.4%  Consistent with prediabetes  >or=6.5%  Consistent with diabetes    High levels of fetal hemoglobin interfere with the HbA1C  assay. Heterozygous hemoglobin variants (HbS, HgC, etc)do  not significantly interfere with this assay.   However, presence of multiple variants may affect accuracy.         Chemistry        Component Value Date/Time     09/25/2023 0651     08/03/2023 0846    K 4.3 09/25/2023 0651    K 5.4 (H) 08/03/2023 0846     09/25/2023 0651     08/03/2023 0846    CO2 23 09/25/2023 0651    CO2 22 (L) 08/03/2023 0846    BUN 16 09/25/2023 0651    BUN 20 08/03/2023 0846    CREATININE 1.13 09/25/2023 0651    CREATININE 1.1 08/03/2023 0846     (H) 08/03/2023 0846        Component Value Date/Time    CALCIUM 8.9 09/25/2023 0651    CALCIUM 10.0 08/03/2023 0846    ALKPHOS 119 08/03/2023 0846    AST 17 08/03/2023 0846    ALT 14 08/03/2023 0846    BILITOT 1.8 (H) 08/03/2023 0846    ESTGFRAFRICA 89 09/25/2023 0651    ESTGFRAFRICA >60.0 11/30/2021 0854     "EGFRNONAA >60.0 11/30/2021 0854          Lab Results   Component Value Date    CHOL 212 (H) 08/03/2023     Lab Results   Component Value Date    HDL 50 08/03/2023     Lab Results   Component Value Date    LDLCALC 144.2 08/03/2023     Lab Results   Component Value Date    TRIG 89 08/03/2023     Lab Results   Component Value Date    CHOLHDL 23.6 08/03/2023     Lab Results   Component Value Date    MICALBCREAT Unable to calculate 08/03/2023     Lab Results   Component Value Date    TSH 1.344 11/30/2021     No results found for: "[id number]"    Assessment:       1. Type 1 diabetes mellitus without complication  Ambulatory referral/consult to Pharmacy Assistance            Plan:        Lantus  Continue  25 units---  rx for 50 to allow for interim titrations.  ----  Cont Humalog 12 plus ss for now.     Cont  atorvastatin 20 mg      Tresiba 25 and Novolog 12 plus ss.     ORDERS 09/06/2024      3 mo  with  A1c prior       Today---fasting cmp, lipids, A1c, TSH, celiac antibodies, urine m/c                             "

## 2024-09-06 NOTE — TELEPHONE ENCOUNTER
We have reached out to Mr. High to inform him of the [employer] application process for Novolog Pens and Tresiba Pen and whats required to apply.  He did not answer.         I mailed a letter and sent a portal message   I was unable to leave a voicemail      Follow-up will be made in 5 business days.    Britton Elkwood   Pharmacy Patient Assistance Team

## 2024-09-06 NOTE — LETTER
September 6, 2024    Carmelo High  [address] [address] [address]             [facility] - Pharmacy Assistance  [facility]  [place] [address]  Fax: [phone]   Dear Mr. High,      My name is Britton Oshea   I am reaching out on behalf of [facility]s Pharmacy Patient Assistance Team after receiving a referral from your Provider inquiring about assistance with your medication. I tried to contact you on 9/6/2024 . Sorry we missed you. Our goal is to assist qualified patients with financial assistance for their medications to better help enrollment for their medications to better help you achieve your health goals.    Please note that enrollment into available support will require the following documents:    Proof of household Income (such as social security statement, 1099 form, pension statement or 3 consecutive pay stubs)  Copy of all insurance cards (front and back)  Print out from your insurance or pharmacy showing how much you have spent on prescriptions this year  Completed Medication Access Center Authorization Forms     Please reach out to my phone number below if you are still in need of assistance with your medications. Follow-up attempt to reach you through MyChart or letter will be made in 5 business days. We look forward to hearing from you soon!    Thank you for choosing [facility] for your healthcare needs    Sincerely  Britton HILTON @[phone]  Pharmacy Patient Assistance Team  [address] [place] [address]  [id number]  [place] [address]  Fax: [phone]  Email: [email or web address]

## 2024-09-09 LAB
GLIADIN PEPTIDE IGA SER-ACNC: 4.9 U/ML
GLIADIN PEPTIDE IGG SER-ACNC: <0.6 U/ML
IGA SERPL-MCNC: 322 MG/DL (ref 70–400)
TTG IGA SER-ACNC: 1.2 U/ML
TTG IGG SER-ACNC: <0.6 U/ML

## 2024-10-03 DIAGNOSIS — E10.9 TYPE 1 DIABETES MELLITUS WITHOUT COMPLICATION: ICD-10-CM

## 2024-10-04 RX ORDER — INSULIN GLARGINE 100 [IU]/ML
INJECTION, SOLUTION SUBCUTANEOUS
Qty: 45 ML | Refills: 3 | Status: SHIPPED | OUTPATIENT
Start: 2024-10-04

## 2024-10-22 DIAGNOSIS — E10.9 TYPE 1 DIABETES MELLITUS WITHOUT COMPLICATION: ICD-10-CM

## 2024-10-22 RX ORDER — INSULIN GLARGINE 100 [IU]/ML
INJECTION, SOLUTION SUBCUTANEOUS
Qty: 45 ML | Refills: 3 | Status: SHIPPED | OUTPATIENT
Start: 2024-10-22

## 2024-11-12 PROBLEM — E83.52 HYPERCALCEMIA: Status: ACTIVE | Noted: 2024-11-12

## 2024-11-12 PROBLEM — E83.42 HYPOMAGNESEMIA: Status: ACTIVE | Noted: 2024-11-12

## 2024-11-12 PROBLEM — E10.10 DIABETIC KETOACIDOSIS WITHOUT COMA ASSOCIATED WITH TYPE 1 DIABETES MELLITUS: Status: ACTIVE | Noted: 2024-11-12

## 2024-11-12 PROBLEM — E86.1 INTRAVASCULAR VOLUME DEPLETION: Status: ACTIVE | Noted: 2024-11-12

## 2024-11-12 PROBLEM — Z71.89 ACP (ADVANCE CARE PLANNING): Status: ACTIVE | Noted: 2024-11-12

## 2024-12-12 ENCOUNTER — OFFICE VISIT (OUTPATIENT)
Dept: ENDOCRINOLOGY | Facility: CLINIC | Age: 34
End: 2024-12-12
Payer: MEDICAID

## 2024-12-12 VITALS
HEART RATE: 68 BPM | WEIGHT: 170.88 LBS | OXYGEN SATURATION: 98 % | SYSTOLIC BLOOD PRESSURE: 136 MMHG | HEIGHT: 70 IN | DIASTOLIC BLOOD PRESSURE: 76 MMHG | BODY MASS INDEX: 24.46 KG/M2

## 2024-12-12 DIAGNOSIS — E10.9 TYPE 1 DIABETES MELLITUS WITHOUT COMPLICATION: Primary | ICD-10-CM

## 2024-12-12 PROBLEM — E83.52 HYPERCALCEMIA: Status: RESOLVED | Noted: 2024-11-12 | Resolved: 2024-12-12

## 2024-12-12 PROBLEM — E86.1 INTRAVASCULAR VOLUME DEPLETION: Status: RESOLVED | Noted: 2024-11-12 | Resolved: 2024-12-12

## 2024-12-12 PROBLEM — E10.10 DIABETIC KETOACIDOSIS WITHOUT COMA ASSOCIATED WITH TYPE 1 DIABETES MELLITUS: Status: RESOLVED | Noted: 2024-11-12 | Resolved: 2024-12-12

## 2024-12-12 PROBLEM — E83.42 HYPOMAGNESEMIA: Status: RESOLVED | Noted: 2024-11-12 | Resolved: 2024-12-12

## 2024-12-12 PROCEDURE — 99213 OFFICE O/P EST LOW 20 MIN: CPT | Mod: PBBFAC,PO | Performed by: NURSE PRACTITIONER

## 2024-12-12 PROCEDURE — 99999 PR PBB SHADOW E&M-EST. PATIENT-LVL III: CPT | Mod: PBBFAC,,, | Performed by: NURSE PRACTITIONER

## 2024-12-12 NOTE — PROGRESS NOTES
Subjective:       Patient ID: Carmelo High is a 34 y.o. male.    Chief Complaint:  Type 1 Diabetes.    Pt is a 34 y.o. AAM  with a diagnosis of Type 1 diabetes mellitus diagnosed approximately 2019 ,after feeling horrible, with admit for glucose > 500.  Pt with freq admits for DKA.  as well as chronic conditions pending review including none .  Other pertinent medical and social information noted includes, but not limited to: None.         Interim Events: Dec 12, 2024:  Hosp last month for DKA.  Forgot to take his basal--then was going to but went off to someones house and further forgot until he was throwing up on side of rode with stomach pains.  Smells like marijuana.  Sensor downloaded.  A1C chronically 13-14%.  Pt advised he needs to get his act together or he is going to be blind, amputated on on HD when he is 42.  Also counseled on potential for pump--but he needs to participated with that as well, as it is not a pancreas.  No focal complaints.      Current DM meds:   Lantus 25  qhs, Humalog 12-12-12 plus ss        Failed DM meds:  na        Back up plan for insulin pump hiatus:   Statin: 40 mg atorvastatin        Not tolerated statin : na   ACE/ARB:none        Not tolerated ACE/ARB: na   Known Diabetic complications: none         Passwords for Tech Accounts:       CGMS Interpretation:             Sensor/Pump Interpretation or Prominent Theme: Erratic with primarily global hyperglycemia, and preciptious glucose decline to near mild/near hypoglycemia     Sept 6, 2024:  Currently without insurance.  So not using sensor. Checks glucoses usually qd, states FBS usually . No hypoglycemia.  He has been paying  cash for insulin.  No acute events or focal complaints. Did not do labs.        Fbs .      march 28, 2024:  Hadnt been using sensors r/t refill issues.  Left glucometer at moms [place] so not really checking the for the last week. Expresses concerns he may need cardiologist.  When he goes to  [facility] and gets in chair and they put cape on he gets very sweaty, lightheaded and needs to stop. Has checked glucoses at these time, and states ok.  He is known for marked claustrophobia.  HE does state sometimes when getting out of bed too fast he will get very dizzy.      Checked glucose last night---HI---    August 15, 2023:   No acute events or focal complaints.  Sensor downloaded. He did have a hypoglycemic episode about 5:30 this AM.  Did not take any Humalog for supper, so downward drift definitely r/t Lantus.  Still not doing much for counting carbs.  If he has a salad does not take humalog  (which looks appropriate as scant prandial increase.  Not some near/mild hypoglycemia in evening--which is overtreated, and then he may hold Humalog wit supper as well. He has also not been taking his atorvastatin       May 12, 2023:  NO acute events. States glucoses are erratic. But states he knows it is diet, and when he does have hypoglycemia he grossly overcompensations.  No other focal compalints.      FEb 7, 2023:  No acute events.  Issues with sensor supplies so not wearing last couple of weeks.  Though sensor downloaded and reviewed. Glucoses erratic.  Not doing official l carb counting, but more of an guess based on experience.  Recognizes he overcompensates with hypoglycemia treatment.  States generally compliant with injections--though may fall asleep and not get lantus until middle of night.     improvement.     Review of Systems   Constitutional:  Negative for appetite change and unexpected weight change.   HENT:  Negative for hearing loss and trouble swallowing.    Eyes:  Negative for photophobia and visual disturbance.   Respiratory:  Negative for cough and shortness of breath.    Cardiovascular:  Negative for chest pain and leg swelling.   Gastrointestinal:  Negative for constipation and diarrhea.   Genitourinary:  Negative for difficulty urinating and urgency.   Musculoskeletal:  Negative for  "arthralgias and myalgias.   Neurological:  Negative for weakness, light-headedness and numbness.   Psychiatric/Behavioral:  Negative for sleep disturbance. The patient is not nervous/anxious.    All other systems reviewed and are negative.        Objective:      Physical Exam  Constitutional:       Appearance: Normal appearance.   HENT:      Head: Normocephalic and atraumatic.      Nose: Nose normal.      Mouth/Throat:      Mouth: Mucous membranes are moist.      Pharynx: Oropharynx is clear.   Eyes:      Extraocular Movements: Extraocular movements intact.      Conjunctiva/sclera: Conjunctivae normal.      Pupils: Pupils are equal, round, and reactive to light.   Neck:      Vascular: No carotid bruit.   Cardiovascular:      Rate and Rhythm: Normal rate and regular rhythm.      Pulses: Normal pulses.      Heart sounds: Normal heart sounds.   Pulmonary:      Effort: Pulmonary effort is normal.      Breath sounds: Normal breath sounds.   Musculoskeletal:         General: Normal range of motion.      Cervical back: Normal range of motion and neck supple.      Right lower leg: No edema.      Left lower leg: No edema.      Comments: Deferred   Feet: no open wounds or calluses.    Onychomycosis.   Pedal pulses +1 bialterally   Vibratory sensation intact bilaterally.    Lymphadenopathy:      Cervical: No cervical adenopathy.   Skin:     General: Skin is warm and dry.   Neurological:      General: No focal deficit present.      Mental Status: He is alert and oriented to person, place, and time.   Psychiatric:         Mood and Affect: Mood normal.         Behavior: Behavior normal.         Thought Content: Thought content normal.         Judgment: Judgment normal.           /76 (Patient Position: Sitting)   Pulse 68   Ht 5' 10" (1.778 m)   Wt 77.5 kg (170 lb 13.7 oz)   SpO2 98%   BMI 24.52 kg/m²     Hemoglobin A1C   Date Value Ref Range Status   11/13/2024 13.1 (H) 0.0 - 5.6 % Final     Comment:     Reference " Interval:  5.0 - 5.6 Normal   5.7 - 6.4 High Risk   > 6.5 Diabetic      Hgb A1c results are standardized based on the (NGSP) National   Glycohemoglobin Standardization Program.      Hemoglobin A1C levels are related to mean serum/plasma glucose   during the preceding 2-3 months.        09/06/2024 >14.0 (H) 4.0 - 5.6 % Final     Comment:     ADA Screening Guidelines:  5.7-6.4%  Consistent with prediabetes  >or=6.5%  Consistent with diabetes    High levels of fetal hemoglobin interfere with the HbA1C  assay. Heterozygous hemoglobin variants (HbS, HgC, etc)do  not significantly interfere with this assay.   However, presence of multiple variants may affect accuracy.     08/03/2023 11.1 (H) 4.0 - 5.6 % Final     Comment:     ADA Screening Guidelines:  5.7-6.4%  Consistent with prediabetes  >or=6.5%  Consistent with diabetes    High levels of fetal hemoglobin interfere with the HbA1C  assay. Heterozygous hemoglobin variants (HbS, HgC, etc)do  not significantly interfere with this assay.   However, presence of multiple variants may affect accuracy.         Chemistry        Component Value Date/Time     11/14/2024 0326    K 3.4 (L) 11/14/2024 0326     11/14/2024 0326    CO2 25 11/14/2024 0326    BUN 13 11/14/2024 0326    CREATININE 0.77 11/14/2024 0326     (H) 11/14/2024 0326        Component Value Date/Time    CALCIUM 8.6 11/14/2024 0326    ALKPHOS 85 11/14/2024 0326    AST 29 11/14/2024 0326    ALT 18 11/14/2024 0326    BILITOT 2.4 (H) 11/14/2024 0326    ESTGFRAFRICA 89 09/25/2023 0651    ESTGFRAFRICA >60.0 11/30/2021 0854    EGFRNONAA >60.0 11/30/2021 0854          Lab Results   Component Value Date    CHOL 164 09/06/2024     Lab Results   Component Value Date    HDL 39 (L) 09/06/2024     Lab Results   Component Value Date    LDLCALC 104.0 09/06/2024     Lab Results   Component Value Date    TRIG 105 09/06/2024     Lab Results   Component Value Date    CHOLHDL 23.8 09/06/2024     Lab Results  "  Component Value Date    MICALBCREAT Unable to calculate 09/06/2024     Lab Results   Component Value Date    TSH 1.869 09/06/2024     No results found for: "[id number]"    Assessment:       1. Type 1 diabetes mellitus without complication  Hemoglobin A1C            Plan:        Lantus  Continue  25 units---  rx for 50 to allow for interim titrations.  ----  Cont Humalog 12 plus ss for now.     Cont  atorvastatin 20 mg       ORDERS 12/12/2024      3 mo  with  A1c prior     Today---fasting cmp, lipids, A1c, TSH, celiac antibodies, urine m/c                               "

## 2025-02-10 DIAGNOSIS — E10.9 TYPE 1 DIABETES MELLITUS WITHOUT COMPLICATION: ICD-10-CM

## 2025-02-11 RX ORDER — INSULIN LISPRO 100 [IU]/ML
INJECTION, SOLUTION INTRAVENOUS; SUBCUTANEOUS
Qty: 12 EACH | Refills: 12 | Status: SHIPPED | OUTPATIENT
Start: 2025-02-11

## 2025-02-11 RX ORDER — PEN NEEDLE, DIABETIC 31 GX5/16"
NEEDLE, DISPOSABLE MISCELLANEOUS
Qty: 100 EACH | Refills: 4 | Status: SHIPPED | OUTPATIENT
Start: 2025-02-11

## 2025-02-11 NOTE — TELEPHONE ENCOUNTER
----- Message from Rebecca sent at 2/10/2025  4:23 PM CST -----  Type:  Pharmacy Calling to Clarify an RX    Name of Caller:  pt   Pharmacy Name:    [id number] - [place] - [address] AT [facility] [address]  [address]  [facility] [place] [address]  Phone: [phone] Fax: [phone]      Prescription Name:  insulin lispro (HUMALOG KWIKPEN INSULIN) 100 unit/mL pen  What do they need to clarify?:  old pharmacy closed pt has new pharmacy.  Best Call Back Number:  [phone]   Additional Information:  pt needs medication sent to pharmacy. Please call back to advise. Thanks!

## 2025-03-05 ENCOUNTER — LAB VISIT (OUTPATIENT)
Dept: LAB | Facility: HOSPITAL | Age: 35
End: 2025-03-05
Attending: NURSE PRACTITIONER
Payer: MEDICAID

## 2025-03-05 DIAGNOSIS — E10.9 TYPE 1 DIABETES MELLITUS WITHOUT COMPLICATION: ICD-10-CM

## 2025-03-05 LAB
ESTIMATED AVG GLUCOSE: 243 MG/DL (ref 68–131)
HBA1C MFR BLD: 10.1 % (ref 4–5.6)

## 2025-03-05 PROCEDURE — 83036 HEMOGLOBIN GLYCOSYLATED A1C: CPT | Performed by: NURSE PRACTITIONER

## 2025-03-05 PROCEDURE — 36415 COLL VENOUS BLD VENIPUNCTURE: CPT | Mod: PO | Performed by: NURSE PRACTITIONER

## 2025-03-12 ENCOUNTER — OFFICE VISIT (OUTPATIENT)
Dept: ENDOCRINOLOGY | Facility: CLINIC | Age: 35
End: 2025-03-12
Payer: MEDICAID

## 2025-03-12 ENCOUNTER — TELEPHONE (OUTPATIENT)
Dept: ENDOCRINOLOGY | Facility: CLINIC | Age: 35
End: 2025-03-12
Payer: MEDICAID

## 2025-03-12 DIAGNOSIS — E10.9 TYPE 1 DIABETES MELLITUS WITHOUT COMPLICATION: Primary | ICD-10-CM

## 2025-03-12 NOTE — PROGRESS NOTES
Subjective:       Patient ID: Carmelo High is a 34 y.o. male.    The patient location is: car   The chief complaint leading to consultation is: diabetes     Visit type: audiovisual    Face to Face time with patient: 8 min     20  minutes of total time spent on the encounter, which includes face to face time and non-face to face time preparing to see the patient (eg, review of tests), Obtaining and/or reviewing separately obtained history, Documenting clinical information in the electronic or other health record, Independently interpreting results (not separately reported) and communicating results to the patient/family/caregiver, or Care coordination (not separately reported).         Each patient to whom he or she provides medical services by telemedicine is:  (1) informed of the relationship between the physician and patient and the respective role of any other health care provider with respect to management of the patient; and (2) notified that he or she may decline to receive medical services by telemedicine and may withdraw from such care at any time.    Notes:    Chief Complaint:  Type 1 Diabetes.    Pt is a 34 y.o. AAM  with a diagnosis of Type 1 diabetes mellitus diagnosed approximately 2019 ,after feeling horrible, with admit for glucose > 500.  Pt with freq admits for DKA.  as well as chronic conditions pending review including none .  Other pertinent medical and social information noted includes, but not limited to: None.         Interim Events: March 12, 2025:  NO acute events or focal complaints. Glucoses continue to be erratic and poorly controlled. I have strongly suggested and encouraged a visit with Diabetes ED--I think he is essentially clueless on carbs and is chasing himself around.  Had a biscuit for breakfast. I asked him how many carbs. He said 8 or 10--(which is likely more 15-30 depending on the biscuit) but he took his full 12 units and he did have a hypoglycemic event afterwards.  But  his A1c is down from >14 to 9% per sensor.     Current DM meds:   Lantus 25  qhs, Humalog 12-12-12 plus ss        Failed DM meds:  na        Back up plan for insulin pump hiatus:   Statin: 40 mg atorvastatin        Not tolerated statin : na   ACE/ARB:none        Not tolerated ACE/ARB: na   Known Diabetic complications: none         Passwords for Tech Accounts:       CGMS Interpretation:             Sensor/Pump Interpretation or Prominent Theme: Erratic with primarily global hyperglycemia, and preciptious glucose decline to near mild/near hypoglycemia       Dec 12, 2024:  Hosp last month for DKA.  Forgot to take his basal--then was going to but went off to [employer] and further forgot until he was throwing up on side of rode with stomach pains.  Smells like marijuana.  Sensor downloaded.  A1C chronically 13-14%.  Pt advised he needs to get his act together or he is going to be blind, amputated on on HD when he is 42.  Also counseled on potential for pump--but he needs to participated with that as well, as it is not a pancreas.  No focal complaints.        Sept 6, 2024:  Currently without insurance.  So not using sensor. Checks glucoses usually qd, states FBS usually . No hypoglycemia.  He has been paying  cash for insulin.  No acute events or focal complaints. Did not do labs.        Fbs .      march 28, 2024:  Hadnt been using sensors r/t refill issues.  Left glucometer at [facility] so not really checking the for the last week. Expresses concerns he may need cardiologist.  When he goes to [facility] and gets in chair and they put cape on he gets very sweaty, lightheaded and needs to stop. Has checked glucoses at these time, and states ok.  He is known for marked claustrophobia.  HE does state sometimes when getting out of bed too fast he will get very dizzy.      Checked glucose last night---HI---    August 15, 2023:   No acute events or focal complaints.  Sensor downloaded. He did have a hypoglycemic  episode about 5:30 this AM.  Did not take any Humalog for supper, so downward drift definitely r/t Lantus.  Still not doing much for counting carbs.  If he has a salad does not take humalog  (which looks appropriate as scant prandial increase.  Not some near/mild hypoglycemia in evening--which is overtreated, and then he may hold Humalog wit supper as well. He has also not been taking his atorvastatin       May 12, 2023:  NO acute events. States glucoses are erratic. But states he knows it is diet, and when he does have hypoglycemia he grossly overcompensations.  No other focal compalints.      FEb 7, 2023:  No acute events.  Issues with sensor supplies so not wearing last couple of weeks.  Though sensor downloaded and reviewed. Glucoses erratic.  Not doing official l carb counting, but more of an guess based on experience.  Recognizes he overcompensates with hypoglycemia treatment.  States generally compliant with injections--though may fall asleep and not get lantus until middle of night.     improvement.     Review of Systems   Constitutional:  Negative for appetite change and unexpected weight change.   HENT:  Negative for hearing loss and trouble swallowing.    Eyes:  Negative for photophobia and visual disturbance.   Respiratory:  Negative for cough and shortness of breath.    Cardiovascular:  Negative for chest pain and leg swelling.   Gastrointestinal:  Negative for constipation and diarrhea.   Genitourinary:  Negative for difficulty urinating and urgency.   Musculoskeletal:  Negative for arthralgias and myalgias.   Neurological:  Negative for weakness, light-headedness and numbness.   Psychiatric/Behavioral:  Negative for sleep disturbance. The patient is not nervous/anxious.    All other systems reviewed and are negative.        Objective:      Physical Exam  Constitutional:       Appearance: Normal appearance. He is normal weight.   HENT:      Head: Normocephalic and atraumatic.      Nose: Nose normal.    Neurological:      General: No focal deficit present.      Mental Status: He is alert and oriented to person, place, and time.   Psychiatric:         Mood and Affect: Mood normal.         Behavior: Behavior normal.         Thought Content: Thought content normal.         Judgment: Judgment normal.         There were no vitals taken for this visit.    Hemoglobin A1C   Date Value Ref Range Status   03/05/2025 10.1 (H) 4.0 - 5.6 % Final     Comment:     ADA Screening Guidelines:  5.7-6.4%  Consistent with prediabetes  >or=6.5%  Consistent with diabetes    High levels of fetal hemoglobin interfere with the HbA1C  assay. Heterozygous hemoglobin variants (HbS, HgC, etc)do  not significantly interfere with this assay.   However, presence of multiple variants may affect accuracy.     11/13/2024 13.1 (H) 0.0 - 5.6 % Final     Comment:     Reference Interval:  5.0 - 5.6 Normal   5.7 - 6.4 High Risk   > 6.5 Diabetic      Hgb A1c results are standardized based on the (NGSP) National   Glycohemoglobin Standardization Program.      Hemoglobin A1C levels are related to mean serum/plasma glucose   during the preceding 2-3 months.        09/06/2024 >14.0 (H) 4.0 - 5.6 % Final     Comment:     ADA Screening Guidelines:  5.7-6.4%  Consistent with prediabetes  >or=6.5%  Consistent with diabetes    High levels of fetal hemoglobin interfere with the HbA1C  assay. Heterozygous hemoglobin variants (HbS, HgC, etc)do  not significantly interfere with this assay.   However, presence of multiple variants may affect accuracy.         Chemistry        Component Value Date/Time     11/14/2024 0326    K 3.4 (L) 11/14/2024 0326     11/14/2024 0326    CO2 25 11/14/2024 0326    BUN 13 11/14/2024 0326    CREATININE 0.77 11/14/2024 0326     (H) 11/14/2024 0326        Component Value Date/Time    CALCIUM 8.6 11/14/2024 0326    ALKPHOS 85 11/14/2024 0326    AST 29 11/14/2024 0326    ALT 18 11/14/2024 0326    BILITOT 2.4 (H)  "11/14/2024 0326    ESTGFRAFRICA 89 09/25/2023 0651    ESTGFRAFRICA >60.0 11/30/2021 0854    EGFRNONAA >60.0 11/30/2021 0854          Lab Results   Component Value Date    CHOL 164 09/06/2024     Lab Results   Component Value Date    HDL 39 (L) 09/06/2024     Lab Results   Component Value Date    LDLCALC 104.0 09/06/2024     Lab Results   Component Value Date    TRIG 105 09/06/2024     Lab Results   Component Value Date    CHOLHDL 23.8 09/06/2024     Lab Results   Component Value Date    MICALBCREAT Unable to calculate 09/06/2024     Lab Results   Component Value Date    TSH 1.869 09/06/2024     No results found for: "[id number]"    Assessment:       No diagnosis found.        Plan:        Lantus  Continue  25 units---  rx for 50 to allow for interim titrations.  ----  Cont Humalog 12 plus ss for now.     Cont  atorvastatin 20 mg       ORDERS 03/12/2025    Cons Diabetes ED--he needs to have a clue on carbs.  Probably not a great candidate for carb counting so may need to do big meal/small meal concept.     3 mo  with  A1c prior                                   "

## 2025-05-13 DIAGNOSIS — E10.9 TYPE 1 DIABETES MELLITUS WITHOUT COMPLICATION: ICD-10-CM

## 2025-05-13 RX ORDER — INSULIN LISPRO 100 [IU]/ML
INJECTION, SOLUTION INTRAVENOUS; SUBCUTANEOUS
Qty: 15 EACH | Refills: 11 | Status: SHIPPED | OUTPATIENT
Start: 2025-05-13

## 2025-06-12 ENCOUNTER — PATIENT MESSAGE (OUTPATIENT)
Dept: ENDOCRINOLOGY | Facility: CLINIC | Age: 35
End: 2025-06-12
Payer: MEDICAID

## 2025-06-12 ENCOUNTER — TELEPHONE (OUTPATIENT)
Dept: ENDOCRINOLOGY | Facility: CLINIC | Age: 35
End: 2025-06-12
Payer: MEDICAID

## 2025-06-12 NOTE — TELEPHONE ENCOUNTER
Copied from CRM #[id number]. Topic: General Inquiry - Patient Advice  >> Jun 12, 2025  3:03 PM Ilene wrote:  Type:  Needs Medical Advice    Who Called: self  Symptoms (please be specific): pt is needing to speak to nurse regarding cdl paperwork. Pt needs dr to fill it out. Pt needs to know if he needs to fax it over or does he need to come in.   Would the patient rather a call back or a response via [facility]? call  Best Call Back Number: [phone] (home)    Additional Information: please advise and thank you

## 2025-07-16 DIAGNOSIS — E10.9 TYPE 1 DIABETES MELLITUS WITHOUT COMPLICATION: ICD-10-CM

## 2025-07-17 RX ORDER — INSULIN ASPART 100 [IU]/ML
INJECTION, SOLUTION INTRAVENOUS; SUBCUTANEOUS
Qty: 15 ML | Refills: 6 | Status: SHIPPED | OUTPATIENT
Start: 2025-07-17

## 2025-08-12 RX ORDER — BLOOD-GLUCOSE SENSOR
EACH MISCELLANEOUS
Qty: 3 EACH | Refills: 12 | Status: SHIPPED | OUTPATIENT
Start: 2025-08-12